# Patient Record
Sex: FEMALE | Race: WHITE | Employment: PART TIME | URBAN - METROPOLITAN AREA
[De-identification: names, ages, dates, MRNs, and addresses within clinical notes are randomized per-mention and may not be internally consistent; named-entity substitution may affect disease eponyms.]

---
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
## 2018-08-07 ENCOUNTER — HOSPITAL ENCOUNTER (OUTPATIENT)
Dept: ULTRASOUND IMAGING | Age: 18
Discharge: HOME | End: 2018-08-07
Payer: COMMERCIAL

## 2018-08-07 DIAGNOSIS — N64.4 BREAST PAIN: ICD-10-CM

## 2018-08-07 PROCEDURE — 76642 ULTRASOUND BREAST LIMITED: CPT

## 2019-11-20 ENCOUNTER — HOSPITAL ENCOUNTER (INPATIENT)
Age: 19
LOS: 5 days | Discharge: HOME | DRG: 885 | End: 2019-11-25
Attending: EMERGENCY MEDICINE | Admitting: PSYCHIATRY & NEUROLOGY
Payer: COMMERCIAL

## 2019-11-20 ENCOUNTER — APPOINTMENT (OUTPATIENT)
Dept: GENERAL RADIOLOGY | Age: 19
DRG: 885 | End: 2019-11-20
Payer: COMMERCIAL

## 2019-11-20 DIAGNOSIS — F39 MOOD DISORDER (HCC): Primary | ICD-10-CM

## 2019-11-20 DIAGNOSIS — S60.221A CONTUSION OF RIGHT HAND, INITIAL ENCOUNTER: ICD-10-CM

## 2019-11-20 PROBLEM — F33.9 MDD (MAJOR DEPRESSIVE DISORDER), RECURRENT EPISODE (HCC): Status: ACTIVE | Noted: 2019-11-20

## 2019-11-20 LAB
A/G RATIO: 1.4 (ref 1.1–2.2)
ACETAMINOPHEN LEVEL: <5 UG/ML (ref 10–30)
ALBUMIN SERPL-MCNC: 4.2 G/DL (ref 3.4–5)
ALP BLD-CCNC: 64 U/L (ref 40–129)
ALT SERPL-CCNC: 10 U/L (ref 10–40)
AMPHETAMINE SCREEN, URINE: ABNORMAL
ANION GAP SERPL CALCULATED.3IONS-SCNC: 9 MMOL/L (ref 3–16)
AST SERPL-CCNC: 16 U/L (ref 15–37)
BACTERIA: ABNORMAL /HPF
BARBITURATE SCREEN URINE: ABNORMAL
BASOPHILS ABSOLUTE: 0 K/UL (ref 0–0.2)
BASOPHILS RELATIVE PERCENT: 0.4 %
BENZODIAZEPINE SCREEN, URINE: ABNORMAL
BILIRUB SERPL-MCNC: 0.3 MG/DL (ref 0–1)
BILIRUBIN URINE: NEGATIVE
BLOOD, URINE: ABNORMAL
BUN BLDV-MCNC: 10 MG/DL (ref 7–20)
CALCIUM SERPL-MCNC: 9.9 MG/DL (ref 8.3–10.6)
CANNABINOID SCREEN URINE: POSITIVE
CHLORIDE BLD-SCNC: 102 MMOL/L (ref 99–110)
CLARITY: ABNORMAL
CO2: 26 MMOL/L (ref 21–32)
COCAINE METABOLITE SCREEN URINE: ABNORMAL
COLOR: YELLOW
CREAT SERPL-MCNC: 0.7 MG/DL (ref 0.6–1.1)
EOSINOPHILS ABSOLUTE: 0.1 K/UL (ref 0–0.6)
EOSINOPHILS RELATIVE PERCENT: 1.5 %
ETHANOL: NORMAL MG/DL (ref 0–0.08)
GFR AFRICAN AMERICAN: >60
GFR NON-AFRICAN AMERICAN: >60
GLOBULIN: 3.1 G/DL
GLUCOSE BLD-MCNC: 79 MG/DL (ref 70–99)
GLUCOSE URINE: NEGATIVE MG/DL
HCG QUALITATIVE: NEGATIVE
HCT VFR BLD CALC: 42.8 % (ref 36–48)
HEMOGLOBIN: 14.1 G/DL (ref 12–16)
KETONES, URINE: NEGATIVE MG/DL
LEUKOCYTE ESTERASE, URINE: NEGATIVE
LYMPHOCYTES ABSOLUTE: 1.2 K/UL (ref 1–5.1)
LYMPHOCYTES RELATIVE PERCENT: 16.4 %
Lab: ABNORMAL
MCH RBC QN AUTO: 30.4 PG (ref 26–34)
MCHC RBC AUTO-ENTMCNC: 33 G/DL (ref 31–36)
MCV RBC AUTO: 92.2 FL (ref 80–100)
METHADONE SCREEN, URINE: ABNORMAL
MICROSCOPIC EXAMINATION: YES
MONOCYTES ABSOLUTE: 0.5 K/UL (ref 0–1.3)
MONOCYTES RELATIVE PERCENT: 6.9 %
NEUTROPHILS ABSOLUTE: 5.3 K/UL (ref 1.7–7.7)
NEUTROPHILS RELATIVE PERCENT: 74.8 %
NITRITE, URINE: NEGATIVE
OPIATE SCREEN URINE: ABNORMAL
OXYCODONE URINE: ABNORMAL
PDW BLD-RTO: 12.6 % (ref 12.4–15.4)
PH UA: 8.5
PH UA: 8.5 (ref 5–8)
PHENCYCLIDINE SCREEN URINE: ABNORMAL
PLATELET # BLD: 291 K/UL (ref 135–450)
PMV BLD AUTO: 7.5 FL (ref 5–10.5)
POTASSIUM REFLEX MAGNESIUM: 4 MMOL/L (ref 3.5–5.1)
PROPOXYPHENE SCREEN: ABNORMAL
PROTEIN UA: 100 MG/DL
RBC # BLD: 4.64 M/UL (ref 4–5.2)
RBC UA: ABNORMAL /HPF (ref 0–2)
SALICYLATE, SERUM: <0.3 MG/DL (ref 15–30)
SODIUM BLD-SCNC: 137 MMOL/L (ref 136–145)
SPECIFIC GRAVITY UA: 1.02 (ref 1–1.03)
TOTAL PROTEIN: 7.3 G/DL (ref 6.4–8.2)
TRICYCLIC, URINE: NORMAL
TSH SERPL DL<=0.05 MIU/L-ACNC: 0.17 UIU/ML (ref 0.27–4.2)
URINE REFLEX TO CULTURE: ABNORMAL
URINE TYPE: ABNORMAL
UROBILINOGEN, URINE: 1 E.U./DL
WBC # BLD: 7.1 K/UL (ref 4–11)
WBC UA: ABNORMAL /HPF (ref 0–5)

## 2019-11-20 PROCEDURE — 83036 HEMOGLOBIN GLYCOSYLATED A1C: CPT

## 2019-11-20 PROCEDURE — 85025 COMPLETE CBC W/AUTO DIFF WBC: CPT

## 2019-11-20 PROCEDURE — 81001 URINALYSIS AUTO W/SCOPE: CPT

## 2019-11-20 PROCEDURE — 84703 CHORIONIC GONADOTROPIN ASSAY: CPT

## 2019-11-20 PROCEDURE — G0480 DRUG TEST DEF 1-7 CLASSES: HCPCS

## 2019-11-20 PROCEDURE — 1240000000 HC EMOTIONAL WELLNESS R&B

## 2019-11-20 PROCEDURE — 36415 COLL VENOUS BLD VENIPUNCTURE: CPT

## 2019-11-20 PROCEDURE — 84439 ASSAY OF FREE THYROXINE: CPT

## 2019-11-20 PROCEDURE — 99285 EMERGENCY DEPT VISIT HI MDM: CPT

## 2019-11-20 PROCEDURE — 80307 DRUG TEST PRSMV CHEM ANLYZR: CPT

## 2019-11-20 PROCEDURE — 84443 ASSAY THYROID STIM HORMONE: CPT

## 2019-11-20 PROCEDURE — 73130 X-RAY EXAM OF HAND: CPT

## 2019-11-20 PROCEDURE — 80053 COMPREHEN METABOLIC PANEL: CPT

## 2019-11-20 RX ORDER — FLUOXETINE HYDROCHLORIDE 20 MG/1
20 CAPSULE ORAL DAILY
Status: ON HOLD | COMMUNITY
End: 2019-11-25 | Stop reason: SDUPTHER

## 2019-11-20 RX ORDER — ACETAMINOPHEN 325 MG/1
650 TABLET ORAL EVERY 4 HOURS PRN
Status: DISCONTINUED | OUTPATIENT
Start: 2019-11-20 | End: 2019-11-25 | Stop reason: HOSPADM

## 2019-11-20 RX ORDER — TRAZODONE HYDROCHLORIDE 50 MG/1
50 TABLET ORAL NIGHTLY PRN
Status: DISCONTINUED | OUTPATIENT
Start: 2019-11-20 | End: 2019-11-25 | Stop reason: HOSPADM

## 2019-11-20 RX ORDER — NICOTINE 21 MG/24HR
1 PATCH, TRANSDERMAL 24 HOURS TRANSDERMAL DAILY
Status: DISCONTINUED | OUTPATIENT
Start: 2019-11-20 | End: 2019-11-25 | Stop reason: HOSPADM

## 2019-11-20 RX ORDER — IBUPROFEN 400 MG/1
400 TABLET ORAL EVERY 6 HOURS PRN
Status: DISCONTINUED | OUTPATIENT
Start: 2019-11-20 | End: 2019-11-25 | Stop reason: HOSPADM

## 2019-11-20 ASSESSMENT — PATIENT HEALTH QUESTIONNAIRE - PHQ9: SUM OF ALL RESPONSES TO PHQ QUESTIONS 1-9: 5

## 2019-11-20 ASSESSMENT — SLEEP AND FATIGUE QUESTIONNAIRES
AVERAGE NUMBER OF SLEEP HOURS: 5
DO YOU USE A SLEEP AID: NO
DO YOU HAVE DIFFICULTY SLEEPING: NO

## 2019-11-20 ASSESSMENT — PAIN SCALES - GENERAL
PAINLEVEL_OUTOF10: 0
PAINLEVEL_OUTOF10: 0
PAINLEVEL_OUTOF10: 7

## 2019-11-20 ASSESSMENT — PAIN DESCRIPTION - DESCRIPTORS: DESCRIPTORS: ACHING

## 2019-11-20 ASSESSMENT — LIFESTYLE VARIABLES: HISTORY_ALCOHOL_USE: NO

## 2019-11-20 ASSESSMENT — PAIN DESCRIPTION - PAIN TYPE: TYPE: ACUTE PAIN

## 2019-11-20 ASSESSMENT — PAIN DESCRIPTION - LOCATION: LOCATION: HAND

## 2019-11-21 PROBLEM — F11.20 OPIOID USE DISORDER, MODERATE, IN CONTROLLED ENVIRONMENT (HCC): Status: ACTIVE | Noted: 2019-11-21

## 2019-11-21 PROBLEM — F15.20 AMPHETAMINE USE DISORDER, MODERATE, IN CONTROLLED ENVIRONMENT (HCC): Status: ACTIVE | Noted: 2019-11-21

## 2019-11-21 LAB
ESTIMATED AVERAGE GLUCOSE: 99.7 MG/DL
HBA1C MFR BLD: 5.1 %

## 2019-11-21 PROCEDURE — 97535 SELF CARE MNGMENT TRAINING: CPT

## 2019-11-21 PROCEDURE — 99233 SBSQ HOSP IP/OBS HIGH 50: CPT | Performed by: PHYSICIAN ASSISTANT

## 2019-11-21 PROCEDURE — 6370000000 HC RX 637 (ALT 250 FOR IP): Performed by: PSYCHIATRY & NEUROLOGY

## 2019-11-21 PROCEDURE — 97165 OT EVAL LOW COMPLEX 30 MIN: CPT

## 2019-11-21 PROCEDURE — 99223 1ST HOSP IP/OBS HIGH 75: CPT | Performed by: PSYCHIATRY & NEUROLOGY

## 2019-11-21 PROCEDURE — 1240000000 HC EMOTIONAL WELLNESS R&B

## 2019-11-21 RX ORDER — FLUOXETINE HYDROCHLORIDE 20 MG/1
20 CAPSULE ORAL DAILY
Status: DISCONTINUED | OUTPATIENT
Start: 2019-11-21 | End: 2019-11-25 | Stop reason: HOSPADM

## 2019-11-21 RX ADMIN — NICOTINE POLACRILEX 2 MG: 2 GUM, CHEWING BUCCAL at 17:42

## 2019-11-21 RX ADMIN — TRAZODONE HYDROCHLORIDE 50 MG: 50 TABLET ORAL at 22:17

## 2019-11-21 RX ADMIN — FLUOXETINE 20 MG: 20 CAPSULE ORAL at 14:53

## 2019-11-21 ASSESSMENT — SLEEP AND FATIGUE QUESTIONNAIRES
DO YOU USE A SLEEP AID: NO
AVERAGE NUMBER OF SLEEP HOURS: 7
DO YOU HAVE DIFFICULTY SLEEPING: NO

## 2019-11-21 ASSESSMENT — LIFESTYLE VARIABLES: HISTORY_ALCOHOL_USE: NO

## 2019-11-22 LAB — T4 FREE: 1.4 NG/DL (ref 0.9–1.8)

## 2019-11-22 PROCEDURE — 99233 SBSQ HOSP IP/OBS HIGH 50: CPT | Performed by: PSYCHIATRY & NEUROLOGY

## 2019-11-22 PROCEDURE — 6370000000 HC RX 637 (ALT 250 FOR IP): Performed by: PSYCHIATRY & NEUROLOGY

## 2019-11-22 PROCEDURE — 1240000000 HC EMOTIONAL WELLNESS R&B

## 2019-11-22 RX ADMIN — IBUPROFEN 400 MG: 400 TABLET, FILM COATED ORAL at 23:26

## 2019-11-22 RX ADMIN — FLUOXETINE 20 MG: 20 CAPSULE ORAL at 09:49

## 2019-11-22 ASSESSMENT — PAIN SCALES - GENERAL: PAINLEVEL_OUTOF10: 5

## 2019-11-23 PROCEDURE — 6370000000 HC RX 637 (ALT 250 FOR IP): Performed by: PSYCHIATRY & NEUROLOGY

## 2019-11-23 PROCEDURE — 1240000000 HC EMOTIONAL WELLNESS R&B

## 2019-11-23 RX ADMIN — FLUOXETINE 20 MG: 20 CAPSULE ORAL at 09:04

## 2019-11-24 PROCEDURE — 99233 SBSQ HOSP IP/OBS HIGH 50: CPT | Performed by: PSYCHIATRY & NEUROLOGY

## 2019-11-24 PROCEDURE — 6370000000 HC RX 637 (ALT 250 FOR IP): Performed by: PSYCHIATRY & NEUROLOGY

## 2019-11-24 PROCEDURE — 1240000000 HC EMOTIONAL WELLNESS R&B

## 2019-11-24 RX ADMIN — FLUOXETINE 20 MG: 20 CAPSULE ORAL at 07:58

## 2019-11-24 RX ADMIN — TRAZODONE HYDROCHLORIDE 50 MG: 50 TABLET ORAL at 23:22

## 2019-11-25 VITALS
OXYGEN SATURATION: 98 % | WEIGHT: 94 LBS | TEMPERATURE: 98 F | BODY MASS INDEX: 16.66 KG/M2 | HEIGHT: 63 IN | DIASTOLIC BLOOD PRESSURE: 54 MMHG | RESPIRATION RATE: 16 BRPM | SYSTOLIC BLOOD PRESSURE: 98 MMHG | HEART RATE: 72 BPM

## 2019-11-25 PROCEDURE — 6370000000 HC RX 637 (ALT 250 FOR IP): Performed by: PSYCHIATRY & NEUROLOGY

## 2019-11-25 PROCEDURE — 99239 HOSP IP/OBS DSCHRG MGMT >30: CPT | Performed by: PSYCHIATRY & NEUROLOGY

## 2019-11-25 PROCEDURE — 5130000000 HC BRIDGE APPOINTMENT

## 2019-11-25 RX ORDER — FLUOXETINE HYDROCHLORIDE 20 MG/1
20 CAPSULE ORAL DAILY
Qty: 30 CAPSULE | Refills: 0 | Status: SHIPPED | OUTPATIENT
Start: 2019-11-25 | End: 2020-09-28

## 2019-11-25 RX ADMIN — FLUOXETINE 20 MG: 20 CAPSULE ORAL at 08:59

## 2020-06-12 LAB
ABO, EXTERNAL RESULT: NORMAL
HEP B, EXTERNAL RESULT: NEGATIVE
HEPATITIS C ANTIBODY, EXTERNAL RESULT: NEGATIVE
HIV, EXTERNAL RESULT: NONREACTIVE
RH FACTOR, EXTERNAL RESULT: POSITIVE
RPR, EXTERNAL RESULT: NONREACTIVE
RUBELLA TITER, EXTERNAL RESULT: NORMAL

## 2020-06-23 LAB
C. TRACHOMATIS, EXTERNAL RESULT: NEGATIVE
N. GONORRHOEAE, EXTERNAL RESULT: NEGATIVE

## 2020-09-28 ENCOUNTER — APPOINTMENT (OUTPATIENT)
Dept: ULTRASOUND IMAGING | Age: 20
End: 2020-09-28
Payer: COMMERCIAL

## 2020-09-28 ENCOUNTER — HOSPITAL ENCOUNTER (EMERGENCY)
Age: 20
Discharge: HOME | End: 2020-09-28
Attending: EMERGENCY MEDICINE
Payer: COMMERCIAL

## 2020-09-28 VITALS
DIASTOLIC BLOOD PRESSURE: 49 MMHG | BODY MASS INDEX: 19.49 KG/M2 | OXYGEN SATURATION: 100 % | RESPIRATION RATE: 18 BRPM | TEMPERATURE: 98 F | WEIGHT: 110 LBS | SYSTOLIC BLOOD PRESSURE: 99 MMHG | HEIGHT: 63 IN | HEART RATE: 78 BPM

## 2020-09-28 LAB
A/G RATIO: 1.3 (ref 1.1–2.2)
ABO/RH: NORMAL
ALBUMIN SERPL-MCNC: 3.7 G/DL (ref 3.4–5)
ALP BLD-CCNC: 53 U/L (ref 40–129)
ALT SERPL-CCNC: 7 U/L (ref 10–40)
ANION GAP SERPL CALCULATED.3IONS-SCNC: 7 MMOL/L (ref 3–16)
AST SERPL-CCNC: 13 U/L (ref 15–37)
BACTERIA: ABNORMAL /HPF
BASOPHILS ABSOLUTE: 0.1 K/UL (ref 0–0.2)
BASOPHILS RELATIVE PERCENT: 0.9 %
BILIRUB SERPL-MCNC: 0.3 MG/DL (ref 0–1)
BILIRUBIN URINE: NEGATIVE
BLOOD, URINE: ABNORMAL
BUN BLDV-MCNC: 8 MG/DL (ref 7–20)
CALCIUM SERPL-MCNC: 8.9 MG/DL (ref 8.3–10.6)
CHLORIDE BLD-SCNC: 100 MMOL/L (ref 99–110)
CLARITY: CLEAR
CO2: 22 MMOL/L (ref 21–32)
COLOR: YELLOW
CREAT SERPL-MCNC: <0.5 MG/DL (ref 0.6–1.1)
EOSINOPHILS ABSOLUTE: 0 K/UL (ref 0–0.6)
EOSINOPHILS RELATIVE PERCENT: 0.4 %
EPITHELIAL CELLS, UA: ABNORMAL /HPF (ref 0–5)
GFR AFRICAN AMERICAN: >60
GFR NON-AFRICAN AMERICAN: >60
GLOBULIN: 2.9 G/DL
GLUCOSE BLD-MCNC: 73 MG/DL (ref 70–99)
GLUCOSE URINE: NEGATIVE MG/DL
GONADOTROPIN, CHORIONIC (HCG) QUANT: NORMAL MIU/ML
HCT VFR BLD CALC: 35.6 % (ref 36–48)
HEMOGLOBIN: 12.2 G/DL (ref 12–16)
KETONES, URINE: NEGATIVE MG/DL
LEUKOCYTE ESTERASE, URINE: NEGATIVE
LYMPHOCYTES ABSOLUTE: 1.3 K/UL (ref 1–5.1)
LYMPHOCYTES RELATIVE PERCENT: 13.4 %
MCH RBC QN AUTO: 31.2 PG (ref 26–34)
MCHC RBC AUTO-ENTMCNC: 34.4 G/DL (ref 31–36)
MCV RBC AUTO: 90.8 FL (ref 80–100)
MICROSCOPIC EXAMINATION: YES
MONOCYTES ABSOLUTE: 0.5 K/UL (ref 0–1.3)
MONOCYTES RELATIVE PERCENT: 5 %
MUCUS: ABNORMAL /LPF
NEUTROPHILS ABSOLUTE: 7.5 K/UL (ref 1.7–7.7)
NEUTROPHILS RELATIVE PERCENT: 80.3 %
NITRITE, URINE: NEGATIVE
PDW BLD-RTO: 13 % (ref 12.4–15.4)
PH UA: 7 (ref 5–8)
PLATELET # BLD: 312 K/UL (ref 135–450)
PMV BLD AUTO: 7.3 FL (ref 5–10.5)
POTASSIUM SERPL-SCNC: 3.8 MMOL/L (ref 3.5–5.1)
PROTEIN UA: NEGATIVE MG/DL
RBC # BLD: 3.91 M/UL (ref 4–5.2)
RBC UA: ABNORMAL /HPF (ref 0–4)
SODIUM BLD-SCNC: 129 MMOL/L (ref 136–145)
SPECIFIC GRAVITY UA: 1.02 (ref 1–1.03)
TOTAL PROTEIN: 6.6 G/DL (ref 6.4–8.2)
URINE REFLEX TO CULTURE: ABNORMAL
URINE TYPE: ABNORMAL
UROBILINOGEN, URINE: 0.2 E.U./DL
WBC # BLD: 9.4 K/UL (ref 4–11)
WBC UA: ABNORMAL /HPF (ref 0–5)

## 2020-09-28 PROCEDURE — 84702 CHORIONIC GONADOTROPIN TEST: CPT

## 2020-09-28 PROCEDURE — 81001 URINALYSIS AUTO W/SCOPE: CPT

## 2020-09-28 PROCEDURE — 6370000000 HC RX 637 (ALT 250 FOR IP): Performed by: EMERGENCY MEDICINE

## 2020-09-28 PROCEDURE — 80053 COMPREHEN METABOLIC PANEL: CPT

## 2020-09-28 PROCEDURE — 99284 EMERGENCY DEPT VISIT MOD MDM: CPT

## 2020-09-28 PROCEDURE — 85025 COMPLETE CBC W/AUTO DIFF WBC: CPT

## 2020-09-28 PROCEDURE — 86901 BLOOD TYPING SEROLOGIC RH(D): CPT

## 2020-09-28 PROCEDURE — 76770 US EXAM ABDO BACK WALL COMP: CPT

## 2020-09-28 PROCEDURE — 86900 BLOOD TYPING SEROLOGIC ABO: CPT

## 2020-09-28 RX ORDER — CEFDINIR 300 MG/1
300 CAPSULE ORAL 2 TIMES DAILY
Qty: 14 CAPSULE | Refills: 0 | Status: SHIPPED | OUTPATIENT
Start: 2020-09-28 | End: 2020-10-05

## 2020-09-28 RX ORDER — ACETAMINOPHEN 325 MG/1
650 TABLET ORAL EVERY 4 HOURS PRN
Status: DISCONTINUED | OUTPATIENT
Start: 2020-09-28 | End: 2020-09-28 | Stop reason: HOSPADM

## 2020-09-28 RX ORDER — TAMSULOSIN HYDROCHLORIDE 0.4 MG/1
0.4 CAPSULE ORAL DAILY
Qty: 5 CAPSULE | Refills: 0 | Status: ON HOLD | OUTPATIENT
Start: 2020-09-28 | End: 2021-01-15 | Stop reason: HOSPADM

## 2020-09-28 RX ADMIN — ACETAMINOPHEN 650 MG: 325 TABLET ORAL at 15:37

## 2020-09-28 ASSESSMENT — PAIN DESCRIPTION - ORIENTATION: ORIENTATION: LEFT;LOWER

## 2020-09-28 ASSESSMENT — PAIN SCALES - GENERAL
PAINLEVEL_OUTOF10: 8
PAINLEVEL_OUTOF10: 8

## 2020-09-28 ASSESSMENT — PAIN DESCRIPTION - DESCRIPTORS: DESCRIPTORS: CONSTANT

## 2020-09-28 ASSESSMENT — PAIN DESCRIPTION - LOCATION: LOCATION: ABDOMEN

## 2020-09-28 ASSESSMENT — PAIN DESCRIPTION - PAIN TYPE: TYPE: ACUTE PAIN

## 2020-09-28 NOTE — ED NOTES
Consult to [address] [facility] for Dr. Maria Guadalupe Masterson at 9163. Dr. Chary Mckeon office stated that \"She is on call today\" and gave Dr. Chary Mckeon cell phone number    At 1600 Dr. Levar Power called Dr. Maria Guadalupe Masterson directly and completed the consult.      Ramirez Alberts  09/28/20 4209

## 2020-09-28 NOTE — ED NOTES
Call placed to Urology, Dr. Pratima Guerrero at [address] [facility]  09/28/20 1616    Dr. Pratima Guerrero completed consult at [phone] with a call back     [facility]  09/28/20 6862

## 2020-12-28 LAB — GBS, EXTERNAL RESULT: NEGATIVE

## 2021-01-04 LAB — SARS-COV-2: NEGATIVE

## 2021-01-12 ENCOUNTER — HOSPITAL ENCOUNTER (INPATIENT)
Age: 21
LOS: 3 days | Discharge: HOME | End: 2021-01-15
Attending: OBSTETRICS & GYNECOLOGY | Admitting: OBSTETRICS & GYNECOLOGY
Payer: COMMERCIAL

## 2021-01-12 LAB
ABO/RH: NORMAL
AMPHETAMINE SCREEN, URINE: NORMAL
ANTIBODY SCREEN: NORMAL
BARBITURATE SCREEN URINE: NORMAL
BASOPHILS ABSOLUTE: 0 K/UL (ref 0–0.2)
BASOPHILS RELATIVE PERCENT: 0.3 %
BENZODIAZEPINE SCREEN, URINE: NORMAL
BUPRENORPHINE URINE: NORMAL
CANNABINOID SCREEN URINE: NORMAL
COCAINE METABOLITE SCREEN URINE: NORMAL
EOSINOPHILS ABSOLUTE: 0 K/UL (ref 0–0.6)
EOSINOPHILS RELATIVE PERCENT: 0.4 %
HCT VFR BLD CALC: 35.2 % (ref 36–48)
HEMOGLOBIN: 11.6 G/DL (ref 12–16)
LYMPHOCYTES ABSOLUTE: 1.5 K/UL (ref 1–5.1)
LYMPHOCYTES RELATIVE PERCENT: 13.4 %
Lab: NORMAL
MCH RBC QN AUTO: 27.7 PG (ref 26–34)
MCHC RBC AUTO-ENTMCNC: 33 G/DL (ref 31–36)
MCV RBC AUTO: 84.1 FL (ref 80–100)
METHADONE SCREEN, URINE: NORMAL
MONOCYTES ABSOLUTE: 0.7 K/UL (ref 0–1.3)
MONOCYTES RELATIVE PERCENT: 6.7 %
NEUTROPHILS ABSOLUTE: 8.6 K/UL (ref 1.7–7.7)
NEUTROPHILS RELATIVE PERCENT: 79.2 %
OPIATE SCREEN URINE: NORMAL
OXYCODONE URINE: NORMAL
PDW BLD-RTO: 13.7 % (ref 12.4–15.4)
PH UA: 6
PHENCYCLIDINE SCREEN URINE: NORMAL
PLATELET # BLD: 314 K/UL (ref 135–450)
PMV BLD AUTO: 7.8 FL (ref 5–10.5)
PROPOXYPHENE SCREEN: NORMAL
RBC # BLD: 4.18 M/UL (ref 4–5.2)
WBC # BLD: 10.8 K/UL (ref 4–11)

## 2021-01-12 PROCEDURE — 2580000003 HC RX 258: Performed by: OBSTETRICS & GYNECOLOGY

## 2021-01-12 PROCEDURE — 6370000000 HC RX 637 (ALT 250 FOR IP): Performed by: OBSTETRICS & GYNECOLOGY

## 2021-01-12 PROCEDURE — 86900 BLOOD TYPING SEROLOGIC ABO: CPT

## 2021-01-12 PROCEDURE — 80307 DRUG TEST PRSMV CHEM ANLYZR: CPT

## 2021-01-12 PROCEDURE — 86780 TREPONEMA PALLIDUM: CPT

## 2021-01-12 PROCEDURE — 86850 RBC ANTIBODY SCREEN: CPT

## 2021-01-12 PROCEDURE — 85025 COMPLETE CBC W/AUTO DIFF WBC: CPT

## 2021-01-12 PROCEDURE — 1220000000 HC SEMI PRIVATE OB R&B

## 2021-01-12 PROCEDURE — 86901 BLOOD TYPING SEROLOGIC RH(D): CPT

## 2021-01-12 RX ORDER — NALBUPHINE HCL 10 MG/ML
10 AMPUL (ML) INJECTION
Status: DISCONTINUED | OUTPATIENT
Start: 2021-01-12 | End: 2021-01-14

## 2021-01-12 RX ORDER — DOCUSATE SODIUM 100 MG/1
100 CAPSULE, LIQUID FILLED ORAL 2 TIMES DAILY
Status: DISCONTINUED | OUTPATIENT
Start: 2021-01-12 | End: 2021-01-14

## 2021-01-12 RX ORDER — ONDANSETRON 2 MG/ML
4 INJECTION INTRAMUSCULAR; INTRAVENOUS EVERY 6 HOURS PRN
Status: DISCONTINUED | OUTPATIENT
Start: 2021-01-12 | End: 2021-01-14

## 2021-01-12 RX ORDER — SODIUM CHLORIDE 0.9 % (FLUSH) 0.9 %
10 SYRINGE (ML) INJECTION EVERY 12 HOURS SCHEDULED
Status: DISCONTINUED | OUTPATIENT
Start: 2021-01-12 | End: 2021-01-14

## 2021-01-12 RX ORDER — BUTORPHANOL TARTRATE 1 MG/ML
1 INJECTION, SOLUTION INTRAMUSCULAR; INTRAVENOUS
Status: DISCONTINUED | OUTPATIENT
Start: 2021-01-12 | End: 2021-01-14

## 2021-01-12 RX ORDER — SODIUM CHLORIDE, SODIUM LACTATE, POTASSIUM CHLORIDE, CALCIUM CHLORIDE 600; 310; 30; 20 MG/100ML; MG/100ML; MG/100ML; MG/100ML
INJECTION, SOLUTION INTRAVENOUS CONTINUOUS
Status: DISCONTINUED | OUTPATIENT
Start: 2021-01-12 | End: 2021-01-14

## 2021-01-12 RX ORDER — SODIUM CHLORIDE 0.9 % (FLUSH) 0.9 %
10 SYRINGE (ML) INJECTION PRN
Status: DISCONTINUED | OUTPATIENT
Start: 2021-01-12 | End: 2021-01-14

## 2021-01-12 RX ORDER — ACETAMINOPHEN 325 MG/1
650 TABLET ORAL EVERY 4 HOURS PRN
Status: DISCONTINUED | OUTPATIENT
Start: 2021-01-12 | End: 2021-01-14

## 2021-01-12 RX ADMIN — SODIUM CHLORIDE, POTASSIUM CHLORIDE, SODIUM LACTATE AND CALCIUM CHLORIDE: 600; 310; 30; 20 INJECTION, SOLUTION INTRAVENOUS at 17:12

## 2021-01-12 RX ADMIN — DINOPROSTONE 10 MG: 10 INSERT VAGINAL at 18:07

## 2021-01-12 RX ADMIN — SODIUM CHLORIDE, POTASSIUM CHLORIDE, SODIUM LACTATE AND CALCIUM CHLORIDE: 600; 310; 30; 20 INJECTION, SOLUTION INTRAVENOUS at 22:51

## 2021-01-13 ENCOUNTER — ANESTHESIA (OUTPATIENT)
Dept: LABOR AND DELIVERY | Age: 21
End: 2021-01-13
Payer: COMMERCIAL

## 2021-01-13 ENCOUNTER — ANESTHESIA EVENT (OUTPATIENT)
Dept: LABOR AND DELIVERY | Age: 21
End: 2021-01-13
Payer: COMMERCIAL

## 2021-01-13 LAB — TOTAL SYPHILLIS IGG/IGM: NORMAL

## 2021-01-13 PROCEDURE — 2500000003 HC RX 250 WO HCPCS: Performed by: NURSE ANESTHETIST, CERTIFIED REGISTERED

## 2021-01-13 PROCEDURE — 6360000002 HC RX W HCPCS: Performed by: OBSTETRICS & GYNECOLOGY

## 2021-01-13 PROCEDURE — 3700000025 EPIDURAL BLOCK: Performed by: ANESTHESIOLOGY

## 2021-01-13 PROCEDURE — 2580000003 HC RX 258: Performed by: OBSTETRICS & GYNECOLOGY

## 2021-01-13 PROCEDURE — 1220000000 HC SEMI PRIVATE OB R&B

## 2021-01-13 RX ORDER — BUPIVACAINE HYDROCHLORIDE 2.5 MG/ML
INJECTION, SOLUTION EPIDURAL; INFILTRATION; INTRACAUDAL
Status: COMPLETED
Start: 2021-01-13 | End: 2021-01-13

## 2021-01-13 RX ORDER — BUPIVACAINE HYDROCHLORIDE 2.5 MG/ML
INJECTION, SOLUTION EPIDURAL; INFILTRATION; INTRACAUDAL PRN
Status: DISCONTINUED | OUTPATIENT
Start: 2021-01-13 | End: 2021-01-14 | Stop reason: SDUPTHER

## 2021-01-13 RX ORDER — EPHEDRINE SULFATE 50 MG/ML
INJECTION INTRAVENOUS
Status: DISCONTINUED
Start: 2021-01-13 | End: 2021-01-14

## 2021-01-13 RX ADMIN — Medication 1 MILLI-UNITS/MIN: at 08:15

## 2021-01-13 RX ADMIN — SODIUM CHLORIDE, POTASSIUM CHLORIDE, SODIUM LACTATE AND CALCIUM CHLORIDE: 600; 310; 30; 20 INJECTION, SOLUTION INTRAVENOUS at 21:07

## 2021-01-13 RX ADMIN — EPHEDRINE SULFATE 35 MG: 50 INJECTION INTRAMUSCULAR; INTRAVENOUS; SUBCUTANEOUS at 22:31

## 2021-01-13 RX ADMIN — SODIUM CHLORIDE, POTASSIUM CHLORIDE, SODIUM LACTATE AND CALCIUM CHLORIDE: 600; 310; 30; 20 INJECTION, SOLUTION INTRAVENOUS at 08:20

## 2021-01-13 RX ADMIN — BUPIVACAINE HYDROCHLORIDE 6 ML: 2.5 INJECTION, SOLUTION EPIDURAL; INFILTRATION; INTRACAUDAL; PERINEURAL at 21:16

## 2021-01-13 RX ADMIN — EPHEDRINE SULFATE 15 MG: 50 INJECTION INTRAMUSCULAR; INTRAVENOUS; SUBCUTANEOUS at 22:30

## 2021-01-13 RX ADMIN — SODIUM CHLORIDE, POTASSIUM CHLORIDE, SODIUM LACTATE AND CALCIUM CHLORIDE: 600; 310; 30; 20 INJECTION, SOLUTION INTRAVENOUS at 19:32

## 2021-01-13 RX ADMIN — Medication 15 ML/HR: at 21:22

## 2021-01-13 RX ADMIN — ONDANSETRON 4 MG: 2 INJECTION INTRAMUSCULAR; INTRAVENOUS at 22:19

## 2021-01-13 NOTE — H&P
neg  -Reassuring fetal status    Plan:   1. Admit to L&D for delivery  2. Admission labs drawn, f/u labs. 3. Mechanical dilation -cook catheter and pitocin started at 8:15am currently on 3mu  4.  Monitor for labor progression

## 2021-01-14 PROBLEM — O36.5990 IUGR (INTRAUTERINE GROWTH RETARDATION), DELIVERED, CURRENT HOSPITALIZATION: Status: ACTIVE | Noted: 2021-01-14

## 2021-01-14 PROBLEM — F15.91 HISTORY OF METHAMPHETAMINE USE: Status: ACTIVE | Noted: 2021-01-14

## 2021-01-14 PROCEDURE — 88307 TISSUE EXAM BY PATHOLOGIST: CPT

## 2021-01-14 PROCEDURE — 1220000000 HC SEMI PRIVATE OB R&B

## 2021-01-14 PROCEDURE — 51701 INSERT BLADDER CATHETER: CPT

## 2021-01-14 PROCEDURE — 2580000003 HC RX 258: Performed by: OBSTETRICS & GYNECOLOGY

## 2021-01-14 PROCEDURE — 6370000000 HC RX 637 (ALT 250 FOR IP): Performed by: OBSTETRICS & GYNECOLOGY

## 2021-01-14 PROCEDURE — 7200000001 HC VAGINAL DELIVERY

## 2021-01-14 RX ORDER — DOCUSATE SODIUM 100 MG/1
100 CAPSULE, LIQUID FILLED ORAL 2 TIMES DAILY
Status: DISCONTINUED | OUTPATIENT
Start: 2021-01-14 | End: 2021-01-15 | Stop reason: HOSPADM

## 2021-01-14 RX ORDER — FERROUS SULFATE 325(65) MG
325 TABLET ORAL 2 TIMES DAILY WITH MEALS
Status: DISCONTINUED | OUTPATIENT
Start: 2021-01-14 | End: 2021-01-15 | Stop reason: HOSPADM

## 2021-01-14 RX ORDER — ONDANSETRON 2 MG/ML
4 INJECTION INTRAMUSCULAR; INTRAVENOUS EVERY 6 HOURS PRN
Status: DISCONTINUED | OUTPATIENT
Start: 2021-01-14 | End: 2021-01-15 | Stop reason: HOSPADM

## 2021-01-14 RX ORDER — IBUPROFEN 800 MG/1
800 TABLET ORAL EVERY 8 HOURS
Status: DISCONTINUED | OUTPATIENT
Start: 2021-01-15 | End: 2021-01-15 | Stop reason: HOSPADM

## 2021-01-14 RX ORDER — EPHEDRINE SULFATE 50 MG/ML
INJECTION, SOLUTION INTRAVENOUS PRN
Status: DISCONTINUED | OUTPATIENT
Start: 2021-01-13 | End: 2021-01-14 | Stop reason: SDUPTHER

## 2021-01-14 RX ORDER — LANOLIN 100 %
OINTMENT (GRAM) TOPICAL PRN
Status: DISCONTINUED | OUTPATIENT
Start: 2021-01-14 | End: 2021-01-15 | Stop reason: HOSPADM

## 2021-01-14 RX ORDER — SODIUM CHLORIDE 0.9 % (FLUSH) 0.9 %
10 SYRINGE (ML) INJECTION PRN
Status: DISCONTINUED | OUTPATIENT
Start: 2021-01-14 | End: 2021-01-15 | Stop reason: HOSPADM

## 2021-01-14 RX ORDER — METHYLERGONOVINE MALEATE 0.2 MG/ML
200 INJECTION INTRAVENOUS PRN
Status: DISCONTINUED | OUTPATIENT
Start: 2021-01-14 | End: 2021-01-15 | Stop reason: HOSPADM

## 2021-01-14 RX ORDER — SODIUM CHLORIDE 0.9 % (FLUSH) 0.9 %
10 SYRINGE (ML) INJECTION EVERY 12 HOURS SCHEDULED
Status: DISCONTINUED | OUTPATIENT
Start: 2021-01-14 | End: 2021-01-15 | Stop reason: HOSPADM

## 2021-01-14 RX ORDER — SODIUM CHLORIDE, SODIUM LACTATE, POTASSIUM CHLORIDE, CALCIUM CHLORIDE 600; 310; 30; 20 MG/100ML; MG/100ML; MG/100ML; MG/100ML
INJECTION, SOLUTION INTRAVENOUS CONTINUOUS
Status: DISCONTINUED | OUTPATIENT
Start: 2021-01-14 | End: 2021-01-15 | Stop reason: HOSPADM

## 2021-01-14 RX ORDER — ACETAMINOPHEN 325 MG/1
650 TABLET ORAL EVERY 4 HOURS PRN
Status: DISCONTINUED | OUTPATIENT
Start: 2021-01-14 | End: 2021-01-15 | Stop reason: HOSPADM

## 2021-01-14 RX ADMIN — SODIUM CHLORIDE, PRESERVATIVE FREE 10 ML: 5 INJECTION INTRAVENOUS at 10:00

## 2021-01-14 RX ADMIN — BENZOCAINE AND LEVOMENTHOL: 200; 5 SPRAY TOPICAL at 04:22

## 2021-01-14 RX ADMIN — SODIUM CHLORIDE, POTASSIUM CHLORIDE, SODIUM LACTATE AND CALCIUM CHLORIDE: 600; 310; 30; 20 INJECTION, SOLUTION INTRAVENOUS at 00:05

## 2021-01-14 NOTE — ANESTHESIA PROCEDURE NOTES
Epidural Block    Patient location during procedure: OB  Reason for block: labor epidural  Staffing  Resident/CRNA: Excell Peterson, APRN - CRNA  Preanesthetic Checklist  Completed: patient identified, IV checked, risks and benefits discussed, monitors and equipment checked, pre-op evaluation, timeout performed, anesthesia consent given, oxygen available and patient being monitored  Epidural  Patient position: sitting  Prep: ChloraPrep and site prepped and draped  Patient monitoring: continuous pulse ox and frequent blood pressure checks  Approach: midline  Location: lumbar (1-5)  Injection technique: GISELE saline  Provider prep: sterile gloves and mask  Needle  Needle type: Tuohy   Needle gauge: 17 G  Needle length: 3.5 in  Needle insertion depth: 5 cm  Catheter type: side hole  Catheter size: 19 G  Catheter at skin depth: 11 cm  Test dose: negative  Assessment  Hemodynamics: stable  Attempts: 1  Additional Notes  Pt prepped and draped in sterile fashion. Skin wheal with 1% lidocaine. GISELE with 3 cc NS, 25g spinal needle inserted per kaleb. Clear CSF visualized in hub. Needle withdrawn and catheter threaded. Negative test dose 3cc 1.5% Lidocaine with Epinephrine. Sterile dressing applied.

## 2021-01-14 NOTE — PLAN OF CARE
Problem: Anxiety:  Goal: Level of anxiety will decrease  Description: Level of anxiety will decrease  Outcome: Completed     Problem: Breathing Pattern - Ineffective:  Goal: Able to breathe comfortably  Description: Able to breathe comfortably  Outcome: Completed     Problem: Fluid Volume - Imbalance:  Goal: Absence of imbalanced fluid volume signs and symptoms  Description: Absence of imbalanced fluid volume signs and symptoms  Outcome: Completed  Goal: Absence of intrapartum hemorrhage signs and symptoms  Description: Absence of intrapartum hemorrhage signs and symptoms  Outcome: Completed     Problem: Infection - Intrapartum Infection:  Goal: Will show no infection signs and symptoms  Description: Will show no infection signs and symptoms  Outcome: Completed     Problem: Labor Process - Prolonged:  Goal: Labor progression, first stage, within specified pattern  Description: Labor progression, first stage, within specified pattern  Outcome: Completed  Goal: Labor progession, second stage, within specified pattern  Description: Labor progession, second stage, within specified pattern  Outcome: Completed  Goal: Uterine contractions within specified parameters  Description: Uterine contractions within specified parameters  Outcome: Completed     Problem:  Screening:  Goal: Ability to make informed decisions regarding treatment has improved  Description: Ability to make informed decisions regarding treatment has improved  Outcome: Completed     Problem: Pain - Acute:  Goal: Pain level will decrease  Description: Pain level will decrease  Outcome: Completed  Goal: Able to cope with pain  Description: Able to cope with pain  Outcome: Completed     Problem: Tissue Perfusion - Uteroplacental, Altered:  Goal: Absence of abnormal fetal heart rate pattern  Description: Absence of abnormal fetal heart rate pattern  Outcome: Completed     Problem: Urinary Retention:  Goal: Experiences of bladder distention will decrease  Description: Experiences of bladder distention will decrease  Outcome: Completed  Goal: Urinary elimination within specified parameters  Description: Urinary elimination within specified parameters  Outcome: Completed     Problem: Pain:  Goal: Pain level will decrease  Description: Pain level will decrease  Outcome: Completed  Goal: Control of acute pain  Description: Control of acute pain  Outcome: Completed  Goal: Control of chronic pain  Description: Control of chronic pain  Outcome: Completed     Problem: VAGINAL DELIVERY - RECOVERY AND POST PARTUM  Goal: Vital signs are medically acceptable  Outcome: Ongoing  Goal: Patient will remain free of falls  Outcome: Ongoing  Goal: Fundus firm at midline  Outcome: Ongoing  Goal: Moderate rubra without clots, no purulent discharge, no foul smelling lochia  Outcome: Ongoing  Goal: Empties bladder  Outcome: Ongoing  Goal: Verbalizes understanding of normal bowel function resumption  Outcome: Ongoing  Goal: Edema will be absent or minimal  Outcome: Ongoing  Goal: Breasts are soft with nipple integrity intact  Outcome: Ongoing  Goal: Demonstrates appropriate breast feeding techniques  Outcome: Ongoing  Goal: Appropriate behavior observed  Outcome: Ongoing  Goal: Positive Mother-Baby interactions are observed  Outcome: Ongoing  Goal: Perineum intact without discharge or hematoma  Outcome: Ongoing  Goal: Ambulates independently  Outcome: Ongoing     Problem: PAIN  Goal: Patient's pain/discomfort is manageable  Outcome: Ongoing     Problem: KNOWLEDGE DEFICIT  Goal: Patient/S.O. demonstrates understanding of disease process, treatment plan, medications, and discharge instructions.   Outcome: Ongoing

## 2021-01-14 NOTE — LACTATION NOTE
This note was copied from a baby's chart. Breastfeeding education initiated. Introduced self to patient as Lactation RN, name and phone number written on white board in room. Assisted mother with latching infant to left breast in cradle hold, emphasized the importance of positioning and obtaining a deep latch. Infant observed with IAN, SRS and AS. Reviewed with mother what to expect over the next  24-48 hours with infant feedings, infant output, and breast care. Educated mother on how to hand express colostrum. Reviewed infant feeding cues and encouraged mother to allow infant to breast feed on demand, a minimum of 8-12 times a day after the first day of life. Also encouraged mother to avoid giving infant a pacifier or bottle for at least the first two weeks of life. Binder and breast feeding log reviewed, all questions answered. Initial breastfeeding handouts given. Mother instructed to call Lactation nurse for F/U care as needed.

## 2021-01-14 NOTE — ANESTHESIA PRE PROCEDURE
Department of Anesthesiology  Preprocedure Note       Name:  Gail Kaba   Age:  23 y.o.  :  2000                                          MRN:  [id number]         Date:  2021      Surgeon: * No surgeons listed *    Procedure: * No procedures listed *    Medications prior to admission:   Prior to Admission medications    Medication Sig Start Date End Date Taking? Authorizing Provider   Prenatal MV-Min-Fe Fum-FA-DHA (PRENATAL 1 PO) Take by mouth   Yes Historical Provider, MD   tamsulosin (FLOMAX) 0.4 MG capsule Take 1 capsule by mouth daily for 5 doses 9/28/20 10/3/20  Blas Dawn MD       Current medications:    Current Facility-Administered Medications   Medication Dose Route Frequency Provider Last Rate Last Admin    oxytocin (PITOCIN) 30 units in 500 mL infusion  1 asha-units/min Intravenous Continuous Chula Liang DO 11 mL/hr at 21 11 asha-units/min at 21    lactated ringers infusion   Intravenous Continuous Lady Abigail  mL/hr at 21 New Bag at 21    sodium chloride flush 0.9 % injection 10 mL  10 mL Intravenous 2 times per day Lady Abigail MD        sodium chloride flush 0.9 % injection 10 mL  10 mL Intravenous PRN Lady Abigail MD        [facility] injection 4 mg  4 mg Intravenous Q6H PRN Lady Abigail MD        docusate sodium (COLACE) capsule 100 mg  100 mg Oral BID Lady Abigail MD        famotidine (PEPCID) injection 20 mg  20 mg Intravenous BID Lady Abigail MD        nalbuphine (NUBAIN) injection 10 mg  10 mg Intravenous Q3H PRN Lady Abigail MD        butorphanol (STADOL) injection 1 mg  1 mg Intravenous Q3H PRN Lady Abigail MD        acetaminophen (TYLENOL) tablet 650 mg  650 mg Oral Q4H PRN Lady Abigail MD           Allergies:     Allergies   Allergen Reactions    Cephalexin Hives    Peanut-Containing Drug Products Hives       Problem List:    Patient Active Problem List Diagnosis Code    Boxer's fracture Q73.172Q    MDD (major depressive disorder), recurrent episode ([facility] F33.9    Contusion of right hand S60.221A    Abnormal TSH R79.89    Proteinuria R80.9    Cannabis use disorder, moderate, in controlled environment ([facility] F12.20    Tobacco abuse Z72.0    Amphetamine use disorder, moderate, in controlled environment ([facility] F15.20    Opioid use disorder, moderate, in controlled environment ([facility] F11.20       Past Medical History:        Diagnosis Date    Anxiety     Depression     Herpes simplex virus (HSV) infection     last outbreak in 2017 per pt    Nephrolithiasis 09/28/2020       Past Surgical History:        Procedure Laterality Date    TOOTH EXTRACTION         Social History:    Social History     Tobacco Use    Smoking status: Former Smoker     Packs/day: 3.00     Types: Cigarettes     Start date: 2/4/2014    Smokeless tobacco: Never Used   Substance Use Topics    Alcohol use: Not Currently     Comment: occ                                Counseling given: Not Answered      Vital Signs (Current):   Vitals:    01/13/21 1815 01/13/21 1845 01/13/21 1915 01/13/21 1945   BP: 114/64 111/67 (!) 105/59 112/74   Pulse: 91 98 75 86   Resp: 16 16 16 16   Temp:    36.9 °C (98.5 °F)   TempSrc:    Oral   Weight:       Height:                                                  BP Readings from Last 3 Encounters:   01/13/21 112/74   09/28/20 (!) 99/49   06/28/17 123/79       NPO Status: Time of last liquid consumption: 1400                        Time of last solid consumption: 1400                        Date of last liquid consumption: 01/12/21                        Date of last solid food consumption: 01/12/21    BMI:   Wt Readings from Last 3 Encounters:   01/12/21 130 lb (59 kg)   09/28/20 110 lb (49.9 kg)   06/28/17 110 lb 0.2 oz (49.9 kg) (18 %, Z= -0.92)*     * Growth percentiles are based on CDC (Girls, 2-20 Years) data. Body mass index is 22.31 kg/m².     CBC: Lab Results   Component Value Date    WBC 10.8 01/12/2021    RBC 4.18 01/12/2021    HGB 11.6 01/12/2021    HCT 35.2 01/12/2021    MCV 84.1 01/12/2021    RDW 13.7 01/12/2021     01/12/2021       CMP:   Lab Results   Component Value Date     09/28/2020    K 3.8 09/28/2020    K 4.0 11/20/2019     09/28/2020    CO2 22 09/28/2020    BUN 8 09/28/2020    CREATININE <0.5 09/28/2020    GFRAA >60 09/28/2020    AGRATIO 1.3 09/28/2020    LABGLOM >60 09/28/2020    GLUCOSE 73 09/28/2020    PROT 6.6 09/28/2020    CALCIUM 8.9 09/28/2020    BILITOT 0.3 09/28/2020    ALKPHOS 53 09/28/2020    AST 13 09/28/2020    ALT 7 09/28/2020       POC Tests: No results for input(s): POCGLU, POCNA, POCK, POCCL, POCBUN, POCHEMO, POCHCT in the last 72 hours.     Coags: No results found for: PROTIME, INR, APTT    HCG (If Applicable):   Lab Results   Component Value Date    PREGTESTUR Negative 06/20/2017        ABGs: No results found for: PHART, PO2ART, GZB8IAY, PNQ3EQQ, BEART, G0EFGPBL     Type & Screen (If Applicable):  No results found for: LABABO, LABRH    Drug/Infectious Status (If Applicable):  No results found for: HIV, HEPCAB    COVID-19 Screening (If Applicable): No results found for: COVID19      Anesthesia Evaluation  Patient summary reviewed and Nursing notes reviewed no history of anesthetic complications:   Airway: Mallampati: II     Neck ROM: full   Dental: normal exam         Pulmonary:Negative Pulmonary ROS and normal exam                               Cardiovascular:Negative CV ROS                      Neuro/Psych:   Negative Neuro/Psych ROS  (+) depression/anxiety             GI/Hepatic/Renal: Neg GI/Hepatic/Renal ROS            Endo/Other: Negative Endo/Other ROS                    Abdominal:           Vascular:                                        Anesthesia Plan      epidural     ASA 2 - emergent

## 2021-01-14 NOTE — L&D DELIVERY NOTE
Department of Obstetrics and Gynecology  Spontaneous Vaginal Delivery Note      Pre-operative Diagnosis:  Term pregnancy and Induced labor pregnanacy complicated by IUGR    Post-operative Diagnosis:  Living  infant(s) and Female    Information for the patient's :  [facility] [[id number]]                    Infant Wt:   Information for the patient's :  [facility] [[id number]]           APGARS:     Information for the patient's :  [facility] [[id number]]           Anesthesia:  epidural anesthesia    Application and Delivery:   Pt became complete and delivered a viable female infant over intact perineum, bilateral shoulders followed easily and atruamatically. Placenta spontaneous, intact. Bladder catheterized 300mL of clear urine just prior to pushing. Fundus firm pitocin running to facilitate uterine contractions. Vagina and DAYAMI cleared of clots and debris. Upon inspection no lacerations noted. Patient tolerated well. Instrument, needles, sponge count correct. EBL 150mL  Placenta sent to pathology. Delivery Summary:  Labor & Delivery Summary  Labor Onset Date: 21  Labor Onset Time:     Specimen:  Placenta sent to pathology     Intake/Output:     Date 21 - 21 0721 07 - 01/15/21 0700   Shift 3525-8621 0645-4395 6326-0433 24 Hour Total 0049-0900 3983-9516 8641-0739 24 Hour Total   INTAKE   I.V. 1000 2000 1000 4000       Shift Total 1000 2000 1000 4000       OUTPUT   Urine  1600  1600       Shift Total  1600  1600       NET [id number] 2814 2400           Condition:  infant stable to general nursery and mother stable    Blood Type and Rh: A POS        Rubella Immunity Status:   Immune           Infant Feeding:    unknown    Attending Attestation: I performed the procedure.

## 2021-01-14 NOTE — LACTATION NOTE
This note was copied from a baby's chart. Lactation Progress Note      Data:     F/u during lactation rounds with primip breast feedert, who delivered today at [facility]. Pt reports baby has had some good feedings today, but sleepy with this last attempt to offer the breast. Infant is asleep in mom's arms. Action: Introduced self as [facility] on for this evening and offered support. Offered assistance with breast feeding at this time and waking infant. Pt declines stating she feels comfortable breast feeding independently. Gave tips to wake sleepy baby as needed. Encouraged STS contact, undressing infant and changing diaper before feeding, also, gave rousing tips. Encouraged much hand expression of colostrum when offering the breast to encourage interest in latching, and support glucose. Breast feeding education reviewed including breast care, when to expect mature milk supply, expected  feeding behaviors during the first 24-48 hours of life, and how to know infant is getting enough at the breast including appropriate feedings, output, and weight trends. Encouraged to offer the breast when infant is first begining to wake and show hunger cues, and every 2-3 hours if baby is sleepy and without cues. Reassured of normalcy of sleepy behavior on the first DOL as baby recovers from birth, and explained that baby will likely begin cluster feed on the second night of life. Reviewed importance of IAN, and explained how a good latch should look and feel. Encouraged to call for [facility] to assess latch as needed. Instructed how to break latch if ever shallow, pinching or painful and instructed that nipple should be rounded with release from the breast. Reviewed risks related to pacifiers, artificial nipples, and formula supplements when given without medical indication. Encouraged exclusive breast feeding unless medical indication were to arise. Name and number provided on whiteboard.  Encouraged to call for [facility] to assess latch and for f/u support prn. Response: Verbalized understanding of teaching provided. Will call for f/u support prn.

## 2021-01-15 VITALS
HEIGHT: 64 IN | WEIGHT: 130 LBS | HEART RATE: 70 BPM | DIASTOLIC BLOOD PRESSURE: 57 MMHG | BODY MASS INDEX: 22.2 KG/M2 | TEMPERATURE: 98.2 F | RESPIRATION RATE: 16 BRPM | SYSTOLIC BLOOD PRESSURE: 102 MMHG

## 2021-01-15 LAB
HCT VFR BLD CALC: 27.8 % (ref 36–48)
HEMOGLOBIN: 9.1 G/DL (ref 12–16)
MCH RBC QN AUTO: 28 PG (ref 26–34)
MCHC RBC AUTO-ENTMCNC: 32.9 G/DL (ref 31–36)
MCV RBC AUTO: 85.1 FL (ref 80–100)
PDW BLD-RTO: 13.8 % (ref 12.4–15.4)
PLATELET # BLD: 234 K/UL (ref 135–450)
PMV BLD AUTO: 8.2 FL (ref 5–10.5)
RBC # BLD: 3.27 M/UL (ref 4–5.2)
WBC # BLD: 9.3 K/UL (ref 4–11)

## 2021-01-15 PROCEDURE — 85027 COMPLETE CBC AUTOMATED: CPT

## 2021-01-15 PROCEDURE — 2580000003 HC RX 258: Performed by: OBSTETRICS & GYNECOLOGY

## 2021-01-15 PROCEDURE — 36415 COLL VENOUS BLD VENIPUNCTURE: CPT

## 2021-01-15 PROCEDURE — 6370000000 HC RX 637 (ALT 250 FOR IP): Performed by: OBSTETRICS & GYNECOLOGY

## 2021-01-15 RX ORDER — PSEUDOEPHEDRINE HCL 30 MG
100 TABLET ORAL 2 TIMES DAILY PRN
Qty: 60 CAPSULE | Refills: 1 | Status: SHIPPED | OUTPATIENT
Start: 2021-01-15

## 2021-01-15 RX ORDER — IBUPROFEN 800 MG/1
800 TABLET ORAL EVERY 8 HOURS
Qty: 120 TABLET | Refills: 0 | Status: SHIPPED | OUTPATIENT
Start: 2021-01-15

## 2021-01-15 RX ORDER — FERROUS SULFATE 325(65) MG
325 TABLET ORAL 2 TIMES DAILY WITH MEALS
Qty: 60 TABLET | Refills: 1 | Status: SHIPPED | OUTPATIENT
Start: 2021-01-15

## 2021-01-15 RX ADMIN — FERROUS SULFATE TAB 325 MG (65 MG ELEMENTAL FE) 325 MG: 325 (65 FE) TAB at 09:24

## 2021-01-15 RX ADMIN — SODIUM CHLORIDE, PRESERVATIVE FREE 10 ML: 5 INJECTION INTRAVENOUS at 01:13

## 2021-01-15 RX ADMIN — DOCUSATE SODIUM 100 MG: 100 CAPSULE, LIQUID FILLED ORAL at 09:24

## 2021-01-15 NOTE — PROGRESS NOTES
called this RN to get pt update. MD orders this RN to gently tug on guardado bulb to assess if it is out of the cervix. MD would like me to call her back with update after tugging guardado bulb. No further orders received at this time.
 notified by this RN that patient cervix is now AC/90/0. MD states she will be in to see pt. No orders received at this time. Will continue to monitor.
A. [address] notified by this RN of pt symptomatic hypotension. CRNA states she will be right in to see pt.
Ace Drain called by this RN to notify her that Dr. Nancie Villavicencio is requesting her go in to see pt and perform AROM and IUPC placement. MD states she will be in to see pt shortly.
Discharge readiness assessment completed with patient and patient's mother Guadalupe Quintana (caregiver). Patient and grandmother state they have more than enough for patient and baby upon discharge. Anticipate discharge today if possible. Bassinet ready at home, car seat ready. Patient managing her pain well. She has received Tdap but declines her flu vaccine. Pediatrician appointment already set up for Monday afternoon. Breastfeeding. Patient and Guadalupe Quintana deny questions/concerns.
Dr. Cuca Martin called by this RN to give pt update. MD notified that pt now has epidural in place and comfortable with SVE of 5/80-90/-1 with bulging bag of fluid. MD states she would like me to ask the house DR (Dr. Malia Neal) if she could see pt and perform AROM and place an IUPC. No further orders received at this time. Will continue to monitor.
Dr. Kym Christopher notified that PIV has infiltrated and was removed. OK for RN not to place another PIV.
Dr. Lars Jennings called by this RN to update on pt. MD notified that guardado bulb was removed with gentle tug and pt cervix now 5/70/-1. MD orders to continue with plan of care and to recheck cervix in 2 hours. No further orders received at this time. Will continue to monitor.
ID bands checked. Infant's ID band and Mother's matching ID bands removed and taped to discharge instruction sheet, the mother verified as correct and witnessed by RN. Umbilical clamp and HUGS tag removed. Mom and  Infant discharged via wheelchair to private car. Infant placed in car seat per parents. Mom and baby accompanied by family and in stable condition.
Labor Progress Note  [facility]    Subjective:   Patient is seen and examined at request of Dr. Mahendra Garza. Patient is comfortable with epidural    Objective:  /74   Pulse 90   Temp 98.5 °F (36.9 °C) (Oral)   Resp 16   Ht 5' 4\" (1.626 m)   Wt 130 lb (59 kg)   LMP 04/12/2020 (Approximate)   BMI 22.31 kg/m²     Cervix:  Dilation (cm): (P) 5  Effacement (%): (P) 80(80-90)  Cervical Characteristics: (P) Soft, Anterior  Station: (P) -1  Presentation: (P) Vertex  OB Examiner: (SHARATH) STACIE Latif RN    FHTs: 140 baseline, mod timothy, +accels. Early decelerations following amniotomy    Estelle: q 2-3 min    Procedure:   Amniotomy performed without difficulty with moderate amount of clear fluid. IUPC placed without difficulty. Infusions:    oxytocin 11 asha-units/min (01/13/21 2145)    lactated ringers 125 mL/hr at 01/13/21 2107     ? Assessment/Plan:  1. Intrapartum  - FHTs reassuring     Dr. Mahendra Garza updated. Plan per Dr. Mahendra Garza.      Lazarus Slight, DO
Patient actively pushing. RN remains in continuous attendance at the bedside. Assessment & evaluation of fetal heart rate ongoing via continuous EFM.
Postpartum    Pt is a 23 y.o.  PPD1 s/p  recovering well. Pain well controlled. Lochia appropriate. Tolerating diet. Ambulating and voiding.   Vitals:    21 0620 21 0951 21 1759 01/15/21 0110   BP: 100/65 (!) 103/54  (!) 99/54   Pulse: 100 75  75   Resp: 16 16 16 16   Temp: 98.1 °F (36.7 °C) 97.9 °F (36.6 °C) 98.2 °F (36.8 °C) 98.3 °F (36.8 °C)   TempSrc: Oral Oral Oral Oral   Weight:       Height:         Abdomen soft, fundus firm  Ext: non tender    Hgb pending    A/P: PPD1  Vitals wnl  Continue routine postpartum care    Makenna Rg MD
Reviewed postpartum and infant care discharge teaching and AVS with patient and patients mother Jena Kidd. Both report understanding of all instructions, questions answered.
STACIE GonzalezCRNA at bedside for epidural placement.
This RN gently tugged guardado bulb at this time, and with gentle tug the guardado bulb was removed from the vagina. Moderate amount of bloody show noted with removal. SVE performed and pt now 5/70/-1. Pt tolerated well. Will continue to monitor.
__________________________  13. Do you have any other questions or concerns I can address today?  Y/N  __________________________________________________      Teaching During interview :_____________________________________________  ___________________________RN       Date:______________Time:________________

## 2021-01-15 NOTE — LACTATION NOTE
Lactation Progress Note      Data:   F/U on 1/0 breast feeder who is hoping to be d/c home today. Mob states that baby cluster fed last night and is doing well. Output and wt loss are WNL. Action: Discharge teaching done; what to expect in the first few days of life, to feed baby at first sign of hunger cue for total of 8-12 times per day after the first DOL, how to properly position and latch baby, how to know baby is getting enough, engorgement prevention and treatment, avoiding bottles and pacifiers, pumping and community resources. Encouraged to call [de-identified] or [facility]patient [facility] clinic for f/u prn. Response: Verbalized understanding and comfortable with breast feeding for d/c.

## 2021-01-15 NOTE — ANESTHESIA POSTPROCEDURE EVALUATION
Department of Anesthesiology  Postprocedure Note    Patient: Rony Owens  MRN: [id number]  YOB: 2000  Date of evaluation: 1/15/2021  Time:  9:29 AM     Procedure Summary     Date: 01/13/21 Room / Location:     Anesthesia Start: 2103 Anesthesia Stop: 01/14/21 0040    Procedure: Labor Analgesia Diagnosis:     Scheduled Providers:  Responsible Provider: Reginald Paul MD    Anesthesia Type: epidural ASA Status: 2 - Emergent          Anesthesia Type: epidural    Teresa Phase I:      Teresa Phase II:      Last vitals: Reviewed and per EMR flowsheets. Anesthesia Post Evaluation    Level of consciousness: awake and alert  Nausea & Vomiting: no vomiting and no nausea  Complications: no  Cardiovascular status: hemodynamically stable  Respiratory status: acceptable and room air  Hydration status: stable  Comments: Patient is s/p vaginal delivery with epidural analgesia. Progress notes, flow sheets, labs, and MARs reviewed. No apparent or reported anesthesia complications thus far.

## 2021-01-15 NOTE — PLAN OF CARE
Problem: VAGINAL DELIVERY - RECOVERY AND POST PARTUM  Goal: Vital signs are medically acceptable  1/14/2021 2251 by Suman Garcia RN  Outcome: Ongoing  1/14/2021 1631 by Debra Reardon RN  Outcome: Ongoing  1/14/2021 1028 by Delmer Julian RN  Outcome: Ongoing  Goal: Patient will remain free of falls  1/14/2021 2251 by Suman Garcia RN  Outcome: Ongoing  1/14/2021 1631 by Debra Reardon RN  Outcome: Ongoing  1/14/2021 1028 by Delmer Julian RN  Outcome: Ongoing  Goal: Fundus firm at midline  1/14/2021 2251 by Suman Garcia RN  Outcome: Ongoing  1/14/2021 1631 by Debra Reardon RN  Outcome: Ongoing  1/14/2021 1028 by Delmer Julian RN  Outcome: Ongoing  Goal: Moderate rubra without clots, no purulent discharge, no foul smelling lochia  1/14/2021 2251 by Suman Garcia RN  Outcome: Ongoing  1/14/2021 1631 by Debra Reardon RN  Outcome: Ongoing  1/14/2021 1028 by Delmer Julian RN  Outcome: Ongoing  Goal: Empties bladder  1/14/2021 2251 by Suman Garcia RN  Outcome: Ongoing  1/14/2021 1631 by Debra Reardon RN  Outcome: Ongoing  1/14/2021 1028 by Delmer Julian RN  Outcome: Ongoing  Goal: Verbalizes understanding of normal bowel function resumption  1/14/2021 2251 by Suman Garcia RN  Outcome: Ongoing  1/14/2021 1631 by Debra Reardon RN  Outcome: Ongoing  1/14/2021 1028 by Delmer Julian RN  Outcome: Ongoing  Goal: Edema will be absent or minimal  1/14/2021 2251 by Suman Garcia RN  Outcome: Ongoing  1/14/2021 1631 by Debra Reardon RN  Outcome: Ongoing  1/14/2021 1028 by Delmer Julian RN  Outcome: Ongoing  Goal: Breasts are soft with nipple integrity intact  1/14/2021 2251 by Suman Garcia RN  Outcome: Ongoing  1/14/2021 1631 by Debra Reardon RN  Outcome: Ongoing  1/14/2021 1028 by Delmer Iesha, RN  Outcome: Ongoing  Goal: Demonstrates appropriate breast feeding techniques  1/14/2021 2251 by Suman Garcia RN  Outcome: Ongoing  1/14/2021 1631 by Rosie Maldonado RN  Outcome: Ongoing  1/14/2021 1028 by Thierno Liao RN  Outcome: Ongoing  Goal: Appropriate behavior observed  1/14/2021 2251 by Henny Rodriguez RN  Outcome: Ongoing  1/14/2021 1631 by Rosie Maldonado RN  Outcome: Ongoing  1/14/2021 1028 by Thierno Liao RN  Outcome: Ongoing  Goal: Positive Mother-Baby interactions are observed  1/14/2021 2251 by Henny Rodriguez RN  Outcome: Ongoing  1/14/2021 1631 by Rosie Maldonado RN  Outcome: Ongoing  1/14/2021 1028 by Thierno Liao RN  Outcome: Ongoing  Goal: Perineum intact without discharge or hematoma  1/14/2021 2251 by Henny Rodriguez RN  Outcome: Ongoing  1/14/2021 1631 by Rosie Maldonado RN  Outcome: Ongoing  1/14/2021 1028 by Thierno Liao RN  Outcome: Ongoing  Goal: Ambulates independently  1/14/2021 2251 by Henny Rodriguez RN  Outcome: Ongoing  1/14/2021 1631 by Rosie Maldonado RN  Outcome: Ongoing  1/14/2021 1028 by Thierno Liao RN  Outcome: Ongoing

## 2021-01-15 NOTE — DISCHARGE SUMMARY
Obstetric Discharge Summary    Admitting Diagnosis  IUP 38+5 weeks, IUGR  OB History        1    Para   1    Term   1       0    AB   0    Living   1       SAB   0    TAB   0    Ectopic   0    Molar   0    Multiple   0    Live Births   1                Reasons for Admission on 2021  4:38 PM   (spontaneous vaginal delivery) [O80]  No comment available  Induction of Labor    Prenatal Procedures  Weekly u/s    Intrapartum Procedures        Multiple birth?: no        Spontaneous Vaginal Delivery: See Labor and Delivery Summary       Postpartum Procedures  None    Postpartum/Operative Complications       Cooks Data  Information for the patient's :  [facility] [[id number]]   female   Birth Weight: 6 lb 4.4 oz (2.845 kg)     Discharge With Mother  Complications: No    Discharge Diagnosis       Discharge Information  Current Discharge Medication List      START taking these medications    Details   ibuprofen (ADVIL;MOTRIN) 800 MG tablet Take 1 tablet by mouth every 8 hours  Qty: 120 tablet, Refills: 0      ferrous sulfate (IRON 325) 325 (65 Fe) MG tablet Take 1 tablet by mouth 2 times daily (with meals)  Qty: 60 tablet, Refills: 1      docusate sodium (COLACE, DULCOLAX) 100 MG CAPS Take 100 mg by mouth 2 times daily as needed for Constipation  Qty: 60 capsule, Refills: 1         CONTINUE these medications which have NOT CHANGED    Details   Prenatal MV-Min-Fe Fum-FA-DHA (PRENATAL 1 PO) Take by mouth         STOP taking these medications       tamsulosin (FLOMAX) 0.4 MG capsule Comments:   Reason for Stopping:               No discharge procedures on file. Discharge to: Home  Follow up in 4 weeks at [facility]. Comments    PPD 1    No c/o. Lochia<menses. Pt requesting d/c.   d/c ok per More SALMERON.    BP (!) 102/57   Pulse 70   Temp 98.2 °F (36.8 °C) (Oral)   Resp 16   Ht 5' 4\" (1.626 m)   Wt 130 lb (59 kg)   LMP 2020 (Approximate)   Breastfeeding Unknown   BMI

## 2021-01-15 NOTE — CARE COORDINATION
Social Work Consult/Assessment     Reason for Consult: Remote hx meth use, +UDS for MJ during pregnancy, negative on admission  Electronic record reviewed: yes   Delivery information: 19  , girl, \"Silvia\"  Marital Status: Single   Mob's UDS on admission: Neg   Infant's UDS/Cord tox: Neg, cord tox pending      Spoke with Mob today explained SW services. Yes  Living situation: Mob reports to live with her parents and brother  Spouse or significant other: Mob states Fob is not involved  Children:primip  Children's Protective Sevices involvement:  n/a  Support system: Mob states she will have adequate support from family/friends  Domestic [address]: Evan has hx of Depression and Anxiety, no medications. She states her mental health is good at this time. She denies feeling depressed or anxious. Post Partum Depression:Mob states she has been educated regarding s/s of PPD and will reach out to OB/Gyn if symptoms arise  Substance Abuse: Hx of meth however Mob states she did not use meth during her pregnancy. She does admit to using MJ, \"last time I used was about 4 or 5 months ago. \"  Mandatory reporting explained to Mob. Encouraged continued abstinence. Mob declines need for substance abuse counseling info. Social Assistance Programs: WIC-Evan is breastfeeding. She is not currently enrolled in [facility]. Explained the program/eligibility. Food [place]-Evan states her food supply is adequate. FS  Medicaid- Not at this time. Evan is covered under her father's insurance. She will need to apply for Medicaid as secondary for self and for infant. Writer left message for [employer], Prudencio Gar, to follow up with her. Supplies: Mob reports to have car seat, crib, and all other needed supplies  Every [employer]ucceeds: Educated Mob about this program, she states she is not interested at this time.      Summary:No significant concerns. Will await cord tox and f/u if needed.   Kurtis PIEDRA

## 2021-06-11 ENCOUNTER — HOSPITAL ENCOUNTER (EMERGENCY)
Age: 21
Discharge: HOME | End: 2021-06-11
Payer: COMMERCIAL

## 2021-06-11 ENCOUNTER — APPOINTMENT (OUTPATIENT)
Dept: CT IMAGING | Age: 21
End: 2021-06-11
Payer: COMMERCIAL

## 2021-06-11 ENCOUNTER — APPOINTMENT (OUTPATIENT)
Dept: ULTRASOUND IMAGING | Age: 21
End: 2021-06-11
Payer: COMMERCIAL

## 2021-06-11 VITALS
OXYGEN SATURATION: 100 % | RESPIRATION RATE: 16 BRPM | TEMPERATURE: 97.8 F | SYSTOLIC BLOOD PRESSURE: 101 MMHG | BODY MASS INDEX: 18.07 KG/M2 | HEIGHT: 63 IN | DIASTOLIC BLOOD PRESSURE: 76 MMHG | HEART RATE: 67 BPM | WEIGHT: 102 LBS

## 2021-06-11 DIAGNOSIS — N20.1 URETEROLITHIASIS: Primary | ICD-10-CM

## 2021-06-11 DIAGNOSIS — N94.9 ADNEXAL CYST: ICD-10-CM

## 2021-06-11 LAB
A/G RATIO: 1.2 (ref 1.1–2.2)
ALBUMIN SERPL-MCNC: 4.2 G/DL (ref 3.4–5)
ALP BLD-CCNC: 85 U/L (ref 40–129)
ALT SERPL-CCNC: 10 U/L (ref 10–40)
AMPHETAMINE SCREEN, URINE: ABNORMAL
ANION GAP SERPL CALCULATED.3IONS-SCNC: 11 MMOL/L (ref 3–16)
AST SERPL-CCNC: 18 U/L (ref 15–37)
BARBITURATE SCREEN URINE: ABNORMAL
BENZODIAZEPINE SCREEN, URINE: ABNORMAL
BILIRUB SERPL-MCNC: 0.8 MG/DL (ref 0–1)
BILIRUBIN URINE: NEGATIVE
BLOOD, URINE: ABNORMAL
BUN BLDV-MCNC: 11 MG/DL (ref 7–20)
CALCIUM SERPL-MCNC: 9.6 MG/DL (ref 8.3–10.6)
CANNABINOID SCREEN URINE: POSITIVE
CHLORIDE BLD-SCNC: 101 MMOL/L (ref 99–110)
CLARITY: CLEAR
CO2: 23 MMOL/L (ref 21–32)
COCAINE METABOLITE SCREEN URINE: ABNORMAL
COLOR: YELLOW
CREAT SERPL-MCNC: 1 MG/DL (ref 0.6–1.1)
EPITHELIAL CELLS, UA: ABNORMAL /HPF (ref 0–5)
GFR AFRICAN AMERICAN: >60
GFR NON-AFRICAN AMERICAN: >60
GLOBULIN: 3.5 G/DL
GLUCOSE BLD-MCNC: 112 MG/DL (ref 70–99)
GLUCOSE URINE: NEGATIVE MG/DL
HCG QUALITATIVE: NEGATIVE
KETONES, URINE: >=80 MG/DL
LEUKOCYTE ESTERASE, URINE: ABNORMAL
LIPASE: 18 U/L (ref 13–60)
Lab: ABNORMAL
METHADONE SCREEN, URINE: ABNORMAL
MICROSCOPIC EXAMINATION: YES
NITRITE, URINE: NEGATIVE
OPIATE SCREEN URINE: ABNORMAL
OXYCODONE URINE: ABNORMAL
PH UA: 6
PH UA: 6 (ref 5–8)
PHENCYCLIDINE SCREEN URINE: ABNORMAL
POTASSIUM REFLEX MAGNESIUM: 4.4 MMOL/L (ref 3.5–5.1)
PROPOXYPHENE SCREEN: ABNORMAL
PROTEIN UA: NEGATIVE MG/DL
RBC UA: ABNORMAL /HPF (ref 0–4)
SODIUM BLD-SCNC: 135 MMOL/L (ref 136–145)
SPECIFIC GRAVITY UA: >=1.03 (ref 1–1.03)
TOTAL PROTEIN: 7.7 G/DL (ref 6.4–8.2)
URINE TYPE: ABNORMAL
UROBILINOGEN, URINE: 0.2 E.U./DL
WBC UA: ABNORMAL /HPF (ref 0–5)

## 2021-06-11 PROCEDURE — 81001 URINALYSIS AUTO W/SCOPE: CPT

## 2021-06-11 PROCEDURE — 96374 THER/PROPH/DIAG INJ IV PUSH: CPT

## 2021-06-11 PROCEDURE — 83690 ASSAY OF LIPASE: CPT

## 2021-06-11 PROCEDURE — 84703 CHORIONIC GONADOTROPIN ASSAY: CPT

## 2021-06-11 PROCEDURE — 6360000004 HC RX CONTRAST MEDICATION: Performed by: PHYSICIAN ASSISTANT

## 2021-06-11 PROCEDURE — 96375 TX/PRO/DX INJ NEW DRUG ADDON: CPT

## 2021-06-11 PROCEDURE — 74177 CT ABD & PELVIS W/CONTRAST: CPT

## 2021-06-11 PROCEDURE — 6360000002 HC RX W HCPCS: Performed by: PHYSICIAN ASSISTANT

## 2021-06-11 PROCEDURE — 76856 US EXAM PELVIC COMPLETE: CPT

## 2021-06-11 PROCEDURE — 6370000000 HC RX 637 (ALT 250 FOR IP): Performed by: PHYSICIAN ASSISTANT

## 2021-06-11 PROCEDURE — 2580000003 HC RX 258: Performed by: PHYSICIAN ASSISTANT

## 2021-06-11 PROCEDURE — 80053 COMPREHEN METABOLIC PANEL: CPT

## 2021-06-11 PROCEDURE — 99284 EMERGENCY DEPT VISIT MOD MDM: CPT

## 2021-06-11 PROCEDURE — 76830 TRANSVAGINAL US NON-OB: CPT

## 2021-06-11 PROCEDURE — 96376 TX/PRO/DX INJ SAME DRUG ADON: CPT

## 2021-06-11 PROCEDURE — 80307 DRUG TEST PRSMV CHEM ANLYZR: CPT

## 2021-06-11 RX ORDER — TAMSULOSIN HYDROCHLORIDE 0.4 MG/1
0.4 CAPSULE ORAL DAILY
Qty: 5 CAPSULE | Refills: 0 | Status: SHIPPED | OUTPATIENT
Start: 2021-06-11 | End: 2021-06-16

## 2021-06-11 RX ORDER — SULFAMETHOXAZOLE AND TRIMETHOPRIM 800; 160 MG/1; MG/1
1 TABLET ORAL 2 TIMES DAILY
Qty: 14 TABLET | Refills: 0 | Status: SHIPPED | OUTPATIENT
Start: 2021-06-11 | End: 2021-06-18

## 2021-06-11 RX ORDER — ONDANSETRON 2 MG/ML
4 INJECTION INTRAMUSCULAR; INTRAVENOUS ONCE
Status: COMPLETED | OUTPATIENT
Start: 2021-06-11 | End: 2021-06-11

## 2021-06-11 RX ORDER — ONDANSETRON 4 MG/1
4 TABLET, ORALLY DISINTEGRATING ORAL EVERY 8 HOURS PRN
Qty: 9 TABLET | Refills: 0 | Status: SHIPPED | OUTPATIENT
Start: 2021-06-11 | End: 2021-06-14

## 2021-06-11 RX ORDER — SULFAMETHOXAZOLE AND TRIMETHOPRIM 800; 160 MG/1; MG/1
1 TABLET ORAL ONCE
Status: COMPLETED | OUTPATIENT
Start: 2021-06-11 | End: 2021-06-11

## 2021-06-11 RX ORDER — 0.9 % SODIUM CHLORIDE 0.9 %
1000 INTRAVENOUS SOLUTION INTRAVENOUS ONCE
Status: COMPLETED | OUTPATIENT
Start: 2021-06-11 | End: 2021-06-11

## 2021-06-11 RX ORDER — KETOROLAC TROMETHAMINE 10 MG/1
10 TABLET, FILM COATED ORAL EVERY 6 HOURS PRN
Qty: 12 TABLET | Refills: 0 | Status: SHIPPED | OUTPATIENT
Start: 2021-06-11 | End: 2021-06-14

## 2021-06-11 RX ORDER — KETOROLAC TROMETHAMINE 30 MG/ML
15 INJECTION, SOLUTION INTRAMUSCULAR; INTRAVENOUS ONCE
Status: COMPLETED | OUTPATIENT
Start: 2021-06-11 | End: 2021-06-11

## 2021-06-11 RX ADMIN — KETOROLAC TROMETHAMINE 15 MG: 30 INJECTION, SOLUTION INTRAMUSCULAR; INTRAVENOUS at 10:29

## 2021-06-11 RX ADMIN — SODIUM CHLORIDE 1000 ML: 9 INJECTION, SOLUTION INTRAVENOUS at 09:48

## 2021-06-11 RX ADMIN — IOPAMIDOL 75 ML: 755 INJECTION, SOLUTION INTRAVENOUS at 10:48

## 2021-06-11 RX ADMIN — ONDANSETRON HYDROCHLORIDE 4 MG: 2 INJECTION, SOLUTION INTRAMUSCULAR; INTRAVENOUS at 10:29

## 2021-06-11 RX ADMIN — ONDANSETRON HYDROCHLORIDE 4 MG: 2 INJECTION, SOLUTION INTRAMUSCULAR; INTRAVENOUS at 09:48

## 2021-06-11 RX ADMIN — SULFAMETHOXAZOLE AND TRIMETHOPRIM 1 TABLET: 800; 160 TABLET ORAL at 12:58

## 2021-06-11 ASSESSMENT — PAIN DESCRIPTION - LOCATION
LOCATION: FLANK
LOCATION: ABDOMEN;FLANK

## 2021-06-11 ASSESSMENT — PAIN DESCRIPTION - FREQUENCY: FREQUENCY: CONTINUOUS

## 2021-06-11 ASSESSMENT — PAIN SCALES - GENERAL
PAINLEVEL_OUTOF10: 7
PAINLEVEL_OUTOF10: 10
PAINLEVEL_OUTOF10: 10
PAINLEVEL_OUTOF10: 4

## 2021-06-11 ASSESSMENT — PAIN DESCRIPTION - PAIN TYPE
TYPE: ACUTE PAIN

## 2021-06-11 ASSESSMENT — PAIN DESCRIPTION - ORIENTATION: ORIENTATION: RIGHT;LEFT

## 2021-06-11 ASSESSMENT — PAIN DESCRIPTION - PROGRESSION: CLINICAL_PROGRESSION: GRADUALLY IMPROVING

## 2021-06-11 ASSESSMENT — PAIN DESCRIPTION - DESCRIPTORS: DESCRIPTORS: SHOOTING;SHARP;STABBING

## 2021-06-11 ASSESSMENT — PAIN DESCRIPTION - ONSET: ONSET: ON-GOING

## 2021-06-11 ASSESSMENT — PAIN - FUNCTIONAL ASSESSMENT: PAIN_FUNCTIONAL_ASSESSMENT: 0-10

## 2021-06-11 NOTE — ED PROVIDER NOTES
[address] ED  EMERGENCY DEPARTMENT ENCOUNTER        Pt Name: Dirk Escobar  MRN: [id number]  Armstrongfso 2000  Date of evaluation: 6/11/2021  Provider: PAULIE Simental  PCP: No primary care provider on file. This patient was not seen and evaluated by the attending physician No att. providers found. I have evaluated this patient. My supervising physician was available for consultation. CHIEF COMPLAINT       No chief complaint on file. HISTORY OF PRESENT ILLNESS   (Location/Symptom, Timing/Onset, Context/Setting, Quality, Duration, Modifying Factors, Severity)  Note limiting factors. Dirk Escobar is a 23 y.o. female with past medical history of substance abuse, nephrolithiasis, HSV who presents via private vehicle with her mom from her home for evaluation of bilateral flank pain. She notes that 2 days ago she developed nausea and since then she has been unable to keep anything down. She denies any hematemesis. She notes that sometimes it appears yellow but she did not denies any bilious emesis. She endorses pain to bilateral low back region but it is worse on the left side. She notes that it is stabbing and constant. Nothing seems to make the pain better or worse. The pain radiates to the left lower quadrant however she also notes that she has pain to the right lower quadrant. She notes that the abdominal pain is also stabbing in nature and is constant. She endorses dysuria that started about 1 week ago. She started her menstrual period today but denies any other hematuria. He notes urinary frequency and urgency that has been getting progressively worse. .  She denies documented fevers but had chills last night. She denies any vaginal discharge no concern for sexually transmitted infection or for pregnancy. She tried Motrin without improvement.   She denies any other symptoms specifically no chest pain no difficulty breathing, no runny nose nasal congestion sore throat headaches. Notes that she has never had pain this bad before, she has had kidney stones in the past but this is more severe although it does feel similar in nature. Nursing Notes were all reviewed and agreed with or any disagreements were addressed  in the HPI. Pt was seen during the [place] 19 pandemic. Appropriate PPE worn by ME during patient encounters. Pt seen during a time with constrained hospital bed capacity and other potential inpatient and outpatient resources were constrained due to the viral pandemic. REVIEW OF SYSTEMS    (2-9 systems for level 4, 10 or more for level 5)     Review of Systems    Positives and Pertinent negatives as per HPI. Except as noted abovein the ROS, all other systems were reviewed and negative.        PAST MEDICAL HISTORY     Past Medical History:   Diagnosis Date    Anxiety     Depression     Herpes simplex virus (HSV) infection     last outbreak in 2017 per pt    Nephrolithiasis 09/28/2020         SURGICAL HISTORY     Past Surgical History:   Procedure Laterality Date    TOOTH EXTRACTION           CURRENTMEDICATIONS       Previous Medications    DOCUSATE SODIUM (COLACE, DULCOLAX) 100 MG CAPS    Take 100 mg by mouth 2 times daily as needed for Constipation    FERROUS SULFATE (IRON 325) 325 (65 FE) MG TABLET    Take 1 tablet by mouth 2 times daily (with meals)    IBUPROFEN (ADVIL;MOTRIN) 800 MG TABLET    Take 1 tablet by mouth every 8 hours    PRENATAL MV-MIN-FE FUM-FA-DHA (PRENATAL 1 PO)    Take by mouth         ALLERGIES     Cephalexin and Peanut-containing drug products    FAMILYHISTORY       Family History   Problem Relation Age of Onset    No Known Problems Mother     No Known Problems Father     No Known Problems Brother           SOCIAL HISTORY       Social History     Socioeconomic History    Marital status: Single     Spouse name: Not on file    Number of children: 0    Years of education: 12    Highest education level: Not on file   Occupational History    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 clean since Dec 2019, +UDS for [facility] on 6/18/20 and 9/16/20    Sexual activity: Not Currently   Other Topics Concern    Not on file   Social History Narrative    Not on file     Social Determinants of Health     Financial Resource Strain:     Difficulty of Paying Living Expenses:    Food Insecurity:     Worried About Running Out of Food in the Last Year:     [address] in the Last Year:    Transportation Needs:     Lack of Transportation (Medical):  Lack of Transportation (Non-Medical):    Physical Activity:     Days of Exercise per Week:     Minutes of Exercise per Session:    Stress:     Feeling of Stress :    Social Connections:     Frequency of Communication with Friends and Family:     Frequency of Social Gatherings with Friends and Family:     Attends Judaism Services:     Active Member of Clubs or Organizations:     Attends Club or Organization Meetings:     Marital Status:    Intimate Partner Violence:     Fear of Current or Ex-Partner:     Emotionally Abused:     Physically Abused:     Sexually Abused:        SCREENINGS             PHYSICAL EXAM    (up to 7 for level 4, 8 or more for level 5)     ED Triage Vitals   BP Temp Temp src Pulse Resp SpO2 Height Weight   -- -- -- -- -- -- -- --       Physical Exam  Vitals and nursing note reviewed. Constitutional:       General: She is awake. She is in acute distress. Appearance: Normal appearance. She is well-developed. She is not ill-appearing, toxic-appearing or diaphoretic.    HENT: Head: Normocephalic and atraumatic. Right Ear: External ear normal.      Left Ear: External ear normal.      Nose: Nose normal.      Mouth/Throat:      Mouth: Mucous membranes are moist.      Pharynx: Oropharynx is clear. Eyes:      General:         Right eye: No discharge. Left eye: No discharge. Extraocular Movements: Extraocular movements intact. Conjunctiva/sclera: Conjunctivae normal.      Pupils: Pupils are equal, round, and reactive to light. Cardiovascular:      Rate and Rhythm: Normal rate and regular rhythm. Pulses: Normal pulses. Heart sounds: Normal heart sounds. No murmur heard. No friction rub. No gallop. Pulmonary:      Effort: Pulmonary effort is normal. No respiratory distress. Breath sounds: Normal breath sounds. No wheezing or rales. Abdominal:      General: Abdomen is flat. Bowel sounds are normal. There is no distension. Palpations: Abdomen is soft. Tenderness: There is abdominal tenderness in the right lower quadrant, suprapubic area and left lower quadrant. There is left CVA tenderness. There is no right CVA tenderness, guarding or rebound. Negative signs include Rovsing's sign and McBurney's sign. Hernia: No hernia is present. Musculoskeletal:      Cervical back: Normal range of motion and neck supple. No rigidity. Right lower leg: No edema. Left lower leg: No edema. Lymphadenopathy:      Cervical: No cervical adenopathy. Skin:     General: Skin is warm and dry. Capillary Refill: Capillary refill takes less than 2 seconds. Coloration: Skin is not jaundiced. Findings: No bruising or rash. Neurological:      General: No focal deficit present. Mental Status: She is alert, oriented to person, place, and time and easily aroused. Cranial Nerves: No cranial nerve deficit. Sensory: No sensory deficit. Motor: No weakness.    Psychiatric:         Mood and Affect: Mood normal. Behavior: Behavior normal. Behavior is cooperative. DIAGNOSTIC RESULTS   LABS:    Labs Reviewed   CBC WITH AUTO DIFFERENTIAL   COMPREHENSIVE METABOLIC PANEL W/ REFLEX TO MG FOR LOW K   LIPASE   URINALYSIS   HCG, SERUM, QUALITATIVE       All other labs were within normal range or not returned as of this dictation. EKG: All EKG's are interpreted by the Emergency Department Physician who either signs orCo-signs this chart in the absence of a cardiologist.  Please see their note for interpretation of EKG. RADIOLOGY:   Non-plain film images such as CT, Ultrasound and MRI are read by the radiologist. Plain radiographic images are visualized andpreliminarily interpreted by the  ED Provider with the below findings:        Interpretation [facility] Radiologist below, if available at the time of this note:    No orders to display     No results found. PROCEDURES   Unless otherwise noted below, none     Procedures    CRITICAL CARE TIME   N/A    CONSULTS:  None      EMERGENCY DEPARTMENT COURSE and DIFFERENTIALDIAGNOSIS/MDM:   Vitals: There were no vitals filed for this visit. Patient was given thefollowing medications:  Medications - No data to display    PDMP Monitoring:    Last PDMP John Grullon as Reviewed [facility]):  Review User Review Instant Review Result            Urine Drug Screenings (1 yr)     Drug Screen Multi Urine With Bup  Collected: 1/12/2021  4:30 PM (Final result)    Narrative: Performed at:  [facility]) - [address]   Phone [phone]    Complete Results          Drug screen multi urine  Collected: 11/20/2019  2:45 PM (Final result)    Narrative: Performed at:  [facility]) - [address],  [place], [facility]   Phone [phone]    Complete Results              Medication Contract and Consent for Opioid Use Documents Filed      No documents found                MDM:   Patient seen and evaluated. Old records reviewed. Diagnostic testing reviewed and results discussed. I have independently evaluated this patient based upon my scope of practice. Supervising physician was in the department for consultation as needed. Patient is a 29-year-old female who presents for evaluation of flank pain. On exam she is alert oriented afebrile well-perfused hemodynamically stable nontoxic. She appears uncomfortable but not toxic in nature. Patient was given IV fluids and Toradol and had significant improvement in her discomfort. On physical exam she has tenderness to the entire lower abdomen worse to the left lower quadrant. No rebound guarding or other peritoneal signs. She also has CVA tenderness. No midline tenderness appreciated. The remainder of her physical exam is without acute focal process. Differentials at this time include ureterolithiasis, pyelonephritis, urinary tract infection, infected urinary stone, ovarian cyst, I have lower concern for ovarian torsion, ectopic pregnancy, appendicitis. Low concern overall for diverticulitis. I have no concern for sepsis at this time given her normal vital signs and lack of fever will obtain labs and imaging. CMP is without acute focal abnormality. LFTs are within normal limits and renal function is maintained. Urinalysis is remarkable for trace pyuria, large blood noted. hCG is negative. CT abdomen pelvis with IV contrast was obtained which reveals a 4 mm obstructing nephrolithiasis in the left distal ureter causing mild to moderate hydroureteronephrosis. She has a simple appearing left adnexal cyst  Pelvic ultrasound reveals enlargement of the left ovary with a 6 cm complex cystic structure which likely represents an atypical hemorrhagic cyst with normal peripheral flow to the left ovary. She has no evidence of torsion at this time. Patient was given IV Toradol and had complete resolution of her pain.   I spent a significant amount of time discussing risks of patient developing torsion at this time. I believe her pain is most likely related to her ureterolithiasis however it is complicated by the presence of the cyst.  Patient will be given a few pills of Toradol and nausea medicine for home and she will be started on Flomax however she was given very strict ER return precautions and told that if she has worsening pain or pain that does not respond like it did to the Toradol today that she needs to return immediately to the emergency department as she could develop ovarian torsion. She was instructed to follow-up with her gynecologist to ensure resolution of the cyst, recommended follow-up at 6 to 12 weeks otherwise. At this time I believe patient is a reasonable candidate for discharge home with very close follow-up with her PCP. I will give her follow-up with urology. I spoke with Dr. Callie Grant who believes she would be reasonable candidate for outpatient trial with flomax. Discussed pyuria and he requests bactrim coverage. Patient and her mom expressed understanding of the risks and they agree with the plan for discharge home. I believe patient will be compliant and follow-up as instructed. Based on patient's clinical history and clinical findings I currently estimate there is low risk for acute appendicitis, bowel obstruction, cholecystitis, diverticulitis, incarcerated hernia, pancreatitis, PID, tubo-ovarian abscess, ectopic pregnancy, perforated bowel, perforated ulcer, UTI, ovarian torsion, pyelonephritis. We have discussed the symptoms which are most concerning (e.g., bloody stool, fever, changing or worsening pain, hematemesis, vaginal bleeding) that necessitate immediate return. FINAL IMPRESSION      1. Ureterolithiasis    2. Adnexal cyst          DISPOSITION/PLAN   DISPOSITION        PATIENT REFERREDTO:  No follow-up provider specified.     DISCHARGE MEDICATIONS:  New Prescriptions    No medications on file       DISCONTINUED MEDICATIONS:  Discontinued Medications    No medications on file              (Please note that portions ofthis note were completed with a voice recognition program.  Efforts were made to edit the dictations but occasionally words are mis-transcribed.)    Radha Dozier (electronically signed)       Radha Dozier  06/11/21 2051

## 2021-06-11 NOTE — ED NOTES
Client arrived to ER, mom at bedside. Reports significant flank, abdomen and back pain, rates 10/10. Client is visibly in distress.       Laury Smith RN  06/11/21 [id number]

## 2025-08-16 ENCOUNTER — HOSPITAL ENCOUNTER (OUTPATIENT)
Age: 25
Setting detail: OBSERVATION
LOS: 1 days | Discharge: HOME | End: 2025-08-17
Attending: STUDENT IN AN ORGANIZED HEALTH CARE EDUCATION/TRAINING PROGRAM | Admitting: PSYCHIATRY & NEUROLOGY

## 2025-08-16 DIAGNOSIS — R45.851 SUICIDAL IDEATION: Primary | ICD-10-CM

## 2025-08-16 PROCEDURE — 99285 EMERGENCY DEPT VISIT HI MDM: CPT

## 2025-08-16 ASSESSMENT — LIFESTYLE VARIABLES
HOW OFTEN DO YOU HAVE A DRINK CONTAINING ALCOHOL: 4 OR MORE TIMES A WEEK
HOW MANY STANDARD DRINKS CONTAINING ALCOHOL DO YOU HAVE ON A TYPICAL DAY: 3 OR 4

## 2025-08-16 ASSESSMENT — PAIN - FUNCTIONAL ASSESSMENT: PAIN_FUNCTIONAL_ASSESSMENT: 0-10

## 2025-08-17 VITALS
OXYGEN SATURATION: 98 % | DIASTOLIC BLOOD PRESSURE: 84 MMHG | HEART RATE: 66 BPM | SYSTOLIC BLOOD PRESSURE: 117 MMHG | TEMPERATURE: 98.1 F | RESPIRATION RATE: 18 BRPM | HEIGHT: 63 IN | BODY MASS INDEX: 16.48 KG/M2 | WEIGHT: 93 LBS

## 2025-08-17 PROBLEM — F33.9 MAJOR DEPRESSION, RECURRENT: Status: ACTIVE | Noted: 2025-08-17

## 2025-08-17 PROBLEM — F32.A DEPRESSIVE DISORDER: Status: ACTIVE | Noted: 2025-08-17

## 2025-08-17 PROBLEM — R45.851 SUICIDAL IDEATION: Status: ACTIVE | Noted: 2025-08-17

## 2025-08-17 LAB
AMPHETAMINES UR QL SCN>1000 NG/ML: ABNORMAL
ANION GAP SERPL CALCULATED.3IONS-SCNC: 12 MMOL/L (ref 3–16)
APAP SERPL-MCNC: <5 UG/ML (ref 10–30)
BACTERIA URNS QL MICRO: ABNORMAL /HPF
BARBITURATES UR QL SCN>200 NG/ML: ABNORMAL
BASOPHILS # BLD: 0.1 K/UL (ref 0–0.2)
BASOPHILS NFR BLD: 0.8 %
BENZODIAZ UR QL SCN>200 NG/ML: ABNORMAL
BILIRUB UR QL STRIP.AUTO: NEGATIVE
BUN SERPL-MCNC: 9 MG/DL (ref 7–20)
CALCIUM SERPL-MCNC: 9.7 MG/DL (ref 8.3–10.6)
CANNABINOIDS UR QL SCN>50 NG/ML: POSITIVE
CHLORIDE SERPL-SCNC: 109 MMOL/L (ref 99–110)
CLARITY UR: CLEAR
CO2 SERPL-SCNC: 24 MMOL/L (ref 21–32)
COCAINE UR QL SCN: ABNORMAL
COLOR UR: YELLOW
CREAT SERPL-MCNC: 0.7 MG/DL (ref 0.6–1.1)
DEPRECATED RDW RBC AUTO: 13.1 % (ref 12.4–15.4)
DRUG SCREEN COMMENT UR-IMP: ABNORMAL
EOSINOPHIL # BLD: 0.1 K/UL (ref 0–0.6)
EOSINOPHIL NFR BLD: 1.1 %
EPI CELLS #/AREA URNS HPF: ABNORMAL /HPF (ref 0–5)
ETHANOLAMINE SERPL-MCNC: 80 MG/DL (ref 0–0.08)
FENTANYL SCREEN, URINE: ABNORMAL
GFR SERPLBLD CREATININE-BSD FMLA CKD-EPI: >90 ML/MIN/{1.73_M2}
GLUCOSE SERPL-MCNC: 62 MG/DL (ref 70–99)
GLUCOSE UR STRIP.AUTO-MCNC: NEGATIVE MG/DL
HCG SERPL QL: NEGATIVE
HCT VFR BLD AUTO: 39.9 % (ref 36–48)
HGB BLD-MCNC: 13.6 G/DL (ref 12–16)
HGB UR QL STRIP.AUTO: ABNORMAL
KETONES UR STRIP.AUTO-MCNC: NEGATIVE MG/DL
LEUKOCYTE ESTERASE UR QL STRIP.AUTO: NEGATIVE
LYMPHOCYTES # BLD: 1.7 K/UL (ref 1–5.1)
LYMPHOCYTES NFR BLD: 23.7 %
MCH RBC QN AUTO: 30.5 PG (ref 26–34)
MCHC RBC AUTO-ENTMCNC: 34 G/DL (ref 31–36)
MCV RBC AUTO: 89.6 FL (ref 80–100)
METHADONE UR QL SCN>300 NG/ML: ABNORMAL
MONOCYTES # BLD: 0.4 K/UL (ref 0–1.3)
MONOCYTES NFR BLD: 5.5 %
MUCOUS THREADS #/AREA URNS LPF: ABNORMAL /LPF
NEUTROPHILS # BLD: 4.9 K/UL (ref 1.7–7.7)
NEUTROPHILS NFR BLD: 68.9 %
NITRITE UR QL STRIP.AUTO: NEGATIVE
OPIATES UR QL SCN>300 NG/ML: ABNORMAL
OXYCODONE UR QL SCN: ABNORMAL
PCP UR QL SCN>25 NG/ML: ABNORMAL
PH UR STRIP.AUTO: 6 [PH] (ref 5–8)
PH UR STRIP: 6 [PH]
PLATELET # BLD AUTO: 292 K/UL (ref 135–450)
PMV BLD AUTO: 7.6 FL (ref 5–10.5)
POTASSIUM SERPL-SCNC: 3.6 MMOL/L (ref 3.5–5.1)
PROT UR STRIP.AUTO-MCNC: NEGATIVE MG/DL
RBC # BLD AUTO: 4.45 M/UL (ref 4–5.2)
RBC #/AREA URNS HPF: ABNORMAL /HPF (ref 0–4)
SALICYLATES SERPL-MCNC: <0.5 MG/DL (ref 15–30)
SODIUM SERPL-SCNC: 145 MMOL/L (ref 136–145)
SP GR UR STRIP.AUTO: >=1.03 (ref 1–1.03)
TSH SERPL DL<=0.005 MIU/L-ACNC: 1.17 UIU/ML (ref 0.27–4.2)
UA DIPSTICK W REFLEX MICRO PNL UR: YES
URN SPEC COLLECT METH UR: ABNORMAL
UROBILINOGEN UR STRIP-ACNC: 0.2 E.U./DL
WBC # BLD AUTO: 7.1 K/UL (ref 4–11)
WBC #/AREA URNS HPF: ABNORMAL /HPF (ref 0–5)

## 2025-08-17 PROCEDURE — 81001 URINALYSIS AUTO W/SCOPE: CPT

## 2025-08-17 PROCEDURE — 80307 DRUG TEST PRSMV CHEM ANLYZR: CPT

## 2025-08-17 PROCEDURE — 90792 PSYCH DIAG EVAL W/MED SRVCS: CPT | Performed by: PSYCHIATRY & NEUROLOGY

## 2025-08-17 PROCEDURE — 90791 PSYCH DIAGNOSTIC EVALUATION: CPT | Performed by: SOCIAL WORKER

## 2025-08-17 PROCEDURE — 80143 DRUG ASSAY ACETAMINOPHEN: CPT

## 2025-08-17 PROCEDURE — 5130000000 HC BRIDGE APPOINTMENT

## 2025-08-17 PROCEDURE — 36415 COLL VENOUS BLD VENIPUNCTURE: CPT

## 2025-08-17 PROCEDURE — 84703 CHORIONIC GONADOTROPIN ASSAY: CPT

## 2025-08-17 PROCEDURE — 84443 ASSAY THYROID STIM HORMONE: CPT

## 2025-08-17 PROCEDURE — 85025 COMPLETE CBC W/AUTO DIFF WBC: CPT

## 2025-08-17 PROCEDURE — G0378 HOSPITAL OBSERVATION PER HR: HCPCS

## 2025-08-17 PROCEDURE — 80048 BASIC METABOLIC PNL TOTAL CA: CPT

## 2025-08-17 PROCEDURE — 80179 DRUG ASSAY SALICYLATE: CPT

## 2025-08-17 PROCEDURE — 82077 ASSAY SPEC XCP UR&BREATH IA: CPT

## 2025-08-17 RX ORDER — ACETAMINOPHEN 325 MG/1
650 TABLET ORAL EVERY 4 HOURS PRN
Status: DISCONTINUED | OUTPATIENT
Start: 2025-08-17 | End: 2025-08-17 | Stop reason: HOSPADM

## 2025-08-17 RX ORDER — IBUPROFEN 400 MG/1
400 TABLET, FILM COATED ORAL EVERY 6 HOURS PRN
Status: DISCONTINUED | OUTPATIENT
Start: 2025-08-17 | End: 2025-08-17 | Stop reason: HOSPADM

## 2025-08-17 RX ORDER — TRAZODONE HYDROCHLORIDE 50 MG/1
50 TABLET ORAL NIGHTLY PRN
Status: DISCONTINUED | OUTPATIENT
Start: 2025-08-17 | End: 2025-08-17 | Stop reason: HOSPADM

## 2025-08-17 RX ORDER — BENZTROPINE MESYLATE 1 MG/ML
2 INJECTION, SOLUTION INTRAMUSCULAR; INTRAVENOUS 2 TIMES DAILY PRN
Status: DISCONTINUED | OUTPATIENT
Start: 2025-08-17 | End: 2025-08-17 | Stop reason: HOSPADM

## 2025-08-17 RX ORDER — NICOTINE 21 MG/24HR
1 PATCH, TRANSDERMAL 24 HOURS TRANSDERMAL DAILY
Status: DISCONTINUED | OUTPATIENT
Start: 2025-08-17 | End: 2025-08-17 | Stop reason: HOSPADM

## 2025-08-17 RX ORDER — POLYETHYLENE GLYCOL 3350 17 G
2 POWDER IN PACKET (EA) ORAL
Status: DISCONTINUED | OUTPATIENT
Start: 2025-08-17 | End: 2025-08-17 | Stop reason: HOSPADM

## 2025-08-17 RX ORDER — OLANZAPINE 10 MG/1
10 TABLET, FILM COATED ORAL EVERY 4 HOURS PRN
Status: DISCONTINUED | OUTPATIENT
Start: 2025-08-17 | End: 2025-08-17 | Stop reason: HOSPADM

## 2025-08-17 RX ORDER — MAGNESIUM HYDROXIDE/ALUMINUM HYDROXICE/SIMETHICONE 120; 1200; 1200 MG/30ML; MG/30ML; MG/30ML
30 SUSPENSION ORAL EVERY 6 HOURS PRN
Status: DISCONTINUED | OUTPATIENT
Start: 2025-08-17 | End: 2025-08-17 | Stop reason: HOSPADM

## 2025-08-17 RX ORDER — HYDROXYZINE HYDROCHLORIDE 50 MG/1
50 TABLET, FILM COATED ORAL 3 TIMES DAILY PRN
Status: DISCONTINUED | OUTPATIENT
Start: 2025-08-17 | End: 2025-08-17 | Stop reason: HOSPADM

## 2025-08-17 ASSESSMENT — PAIN SCALES - GENERAL: PAINLEVEL_OUTOF10: 0

## 2025-08-18 ENCOUNTER — FOLLOWUP TELEPHONE ENCOUNTER (OUTPATIENT)
Dept: PSYCHIATRY | Age: 25
End: 2025-08-18

## 2025-08-19 ENCOUNTER — FOLLOWUP TELEPHONE ENCOUNTER (OUTPATIENT)
Dept: PSYCHIATRY | Age: 25
End: 2025-08-19

## 2025-08-20 ENCOUNTER — FOLLOWUP TELEPHONE ENCOUNTER (OUTPATIENT)
Dept: PSYCHIATRY | Age: 25
End: 2025-08-20